# Patient Record
Sex: MALE | Race: BLACK OR AFRICAN AMERICAN | Employment: OTHER | URBAN - METROPOLITAN AREA
[De-identification: names, ages, dates, MRNs, and addresses within clinical notes are randomized per-mention and may not be internally consistent; named-entity substitution may affect disease eponyms.]

---

## 2017-01-18 ENCOUNTER — OFFICE VISIT (OUTPATIENT)
Dept: ORTHOPEDIC SURGERY | Age: 77
End: 2017-01-18

## 2017-01-18 VITALS
OXYGEN SATURATION: 97 % | WEIGHT: 238 LBS | TEMPERATURE: 98.3 F | BODY MASS INDEX: 34.07 KG/M2 | HEIGHT: 70 IN | RESPIRATION RATE: 16 BRPM | HEART RATE: 93 BPM | DIASTOLIC BLOOD PRESSURE: 82 MMHG | SYSTOLIC BLOOD PRESSURE: 182 MMHG

## 2017-01-18 DIAGNOSIS — M47.816 FACET ARTHRITIS OF LUMBAR REGION: Primary | ICD-10-CM

## 2017-01-18 DIAGNOSIS — G62.9 NEUROPATHY: ICD-10-CM

## 2017-01-18 DIAGNOSIS — M48.061 LUMBAR SPINAL STENOSIS: ICD-10-CM

## 2017-01-18 DIAGNOSIS — M62.830 MUSCLE SPASM OF BACK: ICD-10-CM

## 2017-01-18 RX ORDER — CHOLECALCIFEROL (VITAMIN D3) 125 MCG
CAPSULE ORAL
COMMUNITY
End: 2022-01-14

## 2017-01-18 RX ORDER — PANTOPRAZOLE SODIUM 40 MG/1
40 TABLET, DELAYED RELEASE ORAL DAILY
COMMUNITY
End: 2022-01-14

## 2017-01-18 NOTE — PROGRESS NOTES
[facility]  [address] [place], [address]  Phone: [phone]  Fax: [phone]          HISTORY OF PRESENT ILLNESS:  Murray Weems is a 67 y.o. male with history of lumbar pain with bilateral radicular symptoms. He continues to experience sharp, stabbing pain across the lower back and was given information on a RFA at his last office visit. Pt admits to tiring easily and admits to weakness. He has been using Two Old Goats lotion on both legs with benefit. Pt states that he has experienced relief when using marijuana. He continues to take Plavix and Pletal. Pt reports that his blood sugars are well managed at this time. Pt states that he does not wish to try any additional medication at this time. Pt desires to continue with current care. Of note, Pt ambulates with the assistance of a single point cane. Pain Scale: 7/10    PCP: Phoebe Jhaveri MD       Past Medical History   Diagnosis Date    Diabetes ([facility] [address]     Hypercholesteremia     Hypertension     Lumbar pain     Prostate disease     Sleep apnea         Social History     Social History    Marital status:      Spouse name: N/A    Number of children: N/A    Years of education: N/A     Occupational History    Not on file. Social History Main Topics    Smoking status: Former Smoker     Quit date: 10/12/1989    Smokeless tobacco: Not on file    Alcohol use Yes      Comment: occas    Drug use: No    Sexual activity: Not on file     Other Topics Concern    Not on file     Social History Narrative       Current Outpatient Prescriptions   Medication Sig Dispense Refill    pantoprazole (PROTONIX) 40 mg tablet Take 40 mg by mouth daily.  cholecalciferol, vitamin D3, (VITAMIN D3) 2,000 unit tab Take  by mouth.  cilostazol (PLETAL) 100 mg tablet Take 1 Tab by mouth Before breakfast and dinner.  180 Tab 3    vit B Cmplx 3-FA-Vit C-Biotin (NEPHRO ANTOLIN RX) 3- mg-mg-mcg tablet Take 1 Tab by mouth daily.  linagliptin (TRADJENTA) 5 mg tablet Take 5 mg by mouth daily.  CONTOUR NEXT STRIPS strip       LEVEMIR FLEXTOUCH 100 unit/mL (3 mL) pen       hydrochlorothiazide (HYDRODIURIL) 25 mg tablet Take 25 mg by mouth daily.  losartan (COZAAR) 50 mg tablet Take  by mouth daily.  multivitamin, tx-iron-ca-min (THERA-M W/ IRON) 9 mg iron-400 mcg tab tablet Take 1 Tab by mouth daily.  INSULIN LISPRO (HUMALOG KWIKPEN SC) by SubCUTAneous route.  insulin detemir (LEVEMIR) 100 unit/mL injection 0.2 mL by SubCUTAneous route nightly. 1 Vial 1    tadalafil (CIALIS) 20 mg tablet Take 20 mg by mouth as needed.  rosuvastatin (CRESTOR) 10 mg tablet Take 10 mg by mouth nightly.  glimepiride (AMARYL) 4 mg tablet Take 8 mg by mouth every morning.  clopidogrel (PLAVIX) 75 mg tablet Take 75 mg by mouth daily.  tamsulosin (FLOMAX) 0.4 mg capsule Take 0.4 mg by mouth daily.  alprostadil (MUSE) 1,000 mcg supp 1 Suppository by Urethral route as needed. Allergies   Allergen Reactions    Amlodipine Swelling    Gabapentin Hives    Lyrica [Pregabalin] Swelling    Orange Juice Rash    Pollen Extracts Other (comments)    Rash Away  [Zinc Oxide-White Petrolatum] Rash         REVIEW OF SYSTEMS    Constitutional: Negative for fever, chills, or weight change. Respiratory: Negative for cough or shortness of breath. Cardiovascular: Negative for chest pain or palpitations. Gastrointestinal: Negative for acid reflux, change in bowel habits, or constipation. Genitourinary: Negative for dysuria and flank pain. Musculoskeletal: Positive for lumbar pain. Skin: Negative for rash. Neurological: Negative for headaches, dizziness, or numbness. Endo/Heme/Allergies: Negative for increased bruising. Psychiatric/Behavioral: Negative for difficulty with sleep.       PHYSICAL EXAMINATION  Visit Vitals    /82 (BP 1 Location: Right arm, BP Patient Position: Sitting)    Pulse 93    Temp 98.3 °F (36.8 °C) (Oral)    Resp 16    Ht 5' 10\" (1.778 m)    Wt 238 lb (108 kg)    SpO2 97%    BMI 34.15 kg/m2       Constitutional: Awake, alert, and in no acute distress  Neurological: 1+ symmetrical DTRs in the lower extremities. Sensation to light touch is intact. Skin: warm, dry, and intact. Musculoskeletal: Tenderness to palpation in the lower lumbar region. Moderate pain with extension and axial loading. Better with forward flexion. No pain with internal or external rotation of his hips. Negative straight leg raise bilaterally. Hip Flex  Quads Hamstrings Ankle DF EHL Ankle PF   Right +4/5 +4/5 +4/5 +4/5 +4/5 +4/5   Left +4/5 +4/5 +4/5 +4/5 +4/5 +4/5     IMAGING:    Lumbar Spine MRI from 09/13/2016 was personally reviewed with the Pt and demonstrated:    Results from [place] encounter on 09/13/16   MRI LUMB SPINE WO CONT   Narrative EXAM:  MRI LUMB SPINE WO CONT    INDICATION:   Lumbar spinal stenosis, facet arthritis of lumbar region    COMPARISON: MRI lumbar spine 12/20/2014    TECHNIQUE:   MR imaging of the lumbar spine was performed with sagittal T1, T2, STIR;  axial  T1, T2. Contrast was not administered. FINDINGS:  Mild leftward curvature of the lumbar spine apex left at L3/4. Vertebral body  heights are maintained. Multilevel disc desiccation and disc height loss. Interval worsening of discogenic bone marrow edema signal at L2/3 more towards  the right and central aspect of the disc. Less extensive discogenic bone marrow  edema signal posterior L4/L5. There are chronic fat signal discogenic bone  marrow changes at L3/4. The conus medullaris is normal in signal intensity terminating at upper L2  level. Correlation of sagittal and axial images demonstrates the following:    T12/L1:  The spinal canal and neuroforamina are widely patent. L1/2:  No disc pathology. Patent central canal and neural foramina.     L2/3: Moderately severe disc height loss with diffuse disc bulge with  osteophyte complex. Moderate facet and ligamentous hypertrophy. Severe central  canal stenosis, progressed from prior. Severe right neural foramen stenosis with  right foraminal protrusion impinging on the right L2 foraminal nerve root. Moderate left neural foraminal stenosis similar to prior] hypertrophy contacting  the left foraminal nerve root. L3/4:  Moderately severe disc height loss with diffuse disc bulge and osteophyte  complex. Moderate facet and ligamentum hypertrophy with prominent epidural fat. Moderate to severe central stenosis, similar to prior. Moderate right and mild  left foraminal stenosis similar to prior. L4/5:  Trace grade 1 anterolisthesis and mild disc height loss. Moderate facet  and ligamentous hypertrophy. Decrease in size of the previously seen disc  herniation. Moderate central stenosis improved from prior. Moderate bilateral  foraminal stenosis progressed on the right since comparison. L5/S1:  Mild central disc protrusion. Moderately severe facet hypertrophy. Patent central canal. Mild foraminal stenosis. Overall similar to prior. Asymmetric right psoas muscular atrophy at L3 level. No prevertebral soft tissue  edema or phlegmon. No retroperitoneal adenopathy. Impression IMPRESSION:    1. Moderate to advanced multilevel degenerative disc and facet joint disease and  multifactorial high-grade central canal stenosis progressed at   L2/3 and L3/4 but improved moderate stenosis at L4/5.  -Interval progression of discogenic bone marrow edema at L2/L3 more towards the  right and posterior L4/L5 suggesting inflammation and can be source of pain. 2. Multilevel high-grade foraminal stenosis as discussed  - Of note, right L2/L3 foraminal disc protrusion impinging on the right L2  foraminal nerve root, new since prior.         Peripheral Artery Testing from 07/28/2016 was reviewed and demonstrated:  INTERPRETATION/FINDINGS  Physiologic testing was performed using continuous wave doppler and  segmental pressures. 1. By waveform analysis there is mild to moderate peripheral arterial  insufficiency at rest in the right leg with infrapopliteal disease. 2. By waverform analysis there is mild to moderate peripheral arterial  insufficiency at rest in the left leg with infrapopliteal disease. 3. The right ankle/brachial index is 1.46 and the left is 1.12, both  falsely elevated and unreliable due to arterial calcifications. 4. The right digit/brachial index is 0.59 and the left digit/brachial  index is 0.50.  5. With limited treadmill of 2.5 minutes the drop in AI's cannot be  calculated due to arterial calcifications. The common femoral  arteries were biphasic consistent with normal inflow, the right  posterior tibial artery became monophasic and the left posterior  tibial was biphasic. ASSESSMENT   Reid was seen today for back pain and leg pain. Diagnoses and all orders for this visit:    Facet arthritis of lumbar region  -     REFERRAL TO PAIN MANAGEMENT    Muscle spasm of back  -     REFERRAL TO PAIN MANAGEMENT    Lumbar spinal stenosis  -     REFERRAL TO PAIN MANAGEMENT    Neuropathy  -     REFERRAL TO PAIN MANAGEMENT       IMPRESSION AND PLAN:  Reid Guidry is a 67 y.o. male with history of lumbar pain with bilateral radicular symptoms. He continues to experience sharp, stabbing pain across the lower back and was given information on a RFA at his last office visit. Pt admits to tiring easily and admits to weakness. He has been using Two Old Goats lotion on both legs with benefit. More than 25 minutes of face to face contact was spent with the Pt during today's office visit extensively discussing his symptoms and treatment plan. 1) Pt was given information on lumbar arthritis exercises. 2) Discussed treatment options with the Pt, including a RFA.   3) Pt was given information on radiofrequency ablation. 4) He was referred to Dr. Brian Cornell for RFA evaluation. 5) Pt was informed of proper lifting mechanics. 6) Mr. Lina Montanez has a reminder for a \"due or due soon\" health maintenance. I have asked that he contact his primary care provider, Jazmyne Zhong MD,  for follow-up on this health maintenance. 7)  reviewed. 8) Pt will follow-up in 3 months.       Written by Kei Cordero, as dictated by Negrita Queen MD.

## 2017-01-18 NOTE — PATIENT INSTRUCTIONS
Low Back Arthritis: Exercises  Your Care Instructions  Here are some examples of typical rehabilitation exercises for your condition. Start each exercise slowly. Ease off the exercise if you start to have pain. Your doctor or physical therapist will tell you when you can start these exercises and which ones will work best for you. When you are not being active, find a comfortable position for rest. Some people are comfortable on the floor or a medium-firm bed with a small pillow under their head and another under their knees. Some people prefer to lie on their side with a pillow between their knees. Don't stay in one position for too long. Take short walks (10 to 20 minutes) every 2 to 3 hours. Avoid slopes, hills, and stairs until you feel better. Walk only distances you can manage without pain, especially leg pain. How to do the exercises  Pelvic tilt    1. Lie on your back with your knees bent. 2. \"Brace\" your stomachtighten your muscles by pulling in and imagining your belly button moving toward your spine. 3. Press your lower back into the floor. You should feel your hips and pelvis rock back. 4. Hold for 6 seconds while breathing smoothly. 5. Relax and allow your pelvis and hips to rock forward. 6. Repeat 8 to 12 times. Back stretches    1. Get down on your hands and knees on the floor. 2. Relax your head and allow it to droop. Round your back up toward the ceiling until you feel a nice stretch in your upper, middle, and lower back. Hold this stretch for as long as it feels comfortable, or about 15 to 30 seconds. 3. Return to the starting position with a flat back while you are on your hands and knees. 4. Let your back sway by pressing your stomach toward the floor. Lift your buttocks toward the ceiling. 5. Hold this position for 15 to 30 seconds. 6. Repeat 2 to 4 times. Follow-up care is a key part of your treatment and safety.  Be sure to make and go to all appointments, and call your doctor if you are having problems. It's also a good idea to know your test results and keep a list of the medicines you take. Where can you learn more? Go to http://ree-jaimee.info/. Enter K121 in the search box to learn more about \"Low Back Arthritis: Exercises. \"  Current as of: May 23, 2016  Content Version: 11.1  © 6021-2476 [employer]. Care instructions adapted under license by [employer] (which disclaims liability or warranty for this information). If you have questions about a medical condition or this instruction, always ask your healthcare professional. [place] [address] any warranty or liability for your use of this information. Learning About Medial Branch Block and Neurotomy  What are medial branch block and neurotomy? Facet joints connect your vertebrae to each other. Problems in these joints can cause chronic (long-term) pain in the neck or back. They can sometimes affect the shoulders, arms, buttocks, or legs. Medial branch nerves are the nerves that carry many of the pain messages from your facet joints. Radiofrequency medial branch neurotomy is a type of medial branch neurotomy that is used to relieve arthritis pain. It uses radio waves to damage nerves in your neck or back so that they can no longer send pain messages to your brain. Before your doctor knows if a neurotomy will help you, he or she will do a medial branch block to find out if certain nerves are the ones that are a source of your pain. You will need two separate visits to the outpatient center or hospital to have both procedures. How is a medial branch block done? The doctor will use a tiny needle to numb the skin where you will get the block. Then he or she puts the block needle into the numbed area. You may feel some pressure, but you should not feel pain.  Using fluoroscopy (live X-ray) to guide the needle, the doctor injects medicine onto one or more nerves to make them numb. If you get relief from your pain in the next 4 to 6 hours, it's a sign that those nerves may be contributing to your pain. The relief will last only a short time. You may then have a medial branch neurotomy at a later visit to try to get longer relief. It takes 20 to 30 minutes to get the block. You can go home after the doctor watches you for about an hour. You will get instructions on how to report how much pain you have when you are at home. You will need someone to drive you home. How is medial branch neurotomy done? The doctor will use a tiny needle to numb the skin where you will get the neurotomy. Then he or she puts the neurotomy needle into the numbed area. You may feel some pressure. Using fluoroscopy (live X-ray) to guide the needle, the doctor sends radio waves through the needle to the nerve for 60 to 90 seconds. The radio waves heat the nerve, which damages it. The doctor may do this several times. And he or she may treat more than one nerve. It takes 45 to 90 minutes to get a neurotomy, depending on how many nerves are heated. You will probably go home 30 to 60 minutes later. You will need someone to drive you home. What can you expect after a neurotomy? You may feel a little sore or tender at the injection site at first. But after a successful neurotomy, most people have pain relief right away. It often lasts for 9 to 12 months or longer. Sometimes the pain relief is permanent. If your pain does come back, it may mean that the damaged nerve has healed and can send pain messages again. Or it can mean that a different nerve is causing pain. Your doctor will discuss your options with you. Follow-up care is a key part of your treatment and safety. Be sure to make and go to all appointments, and call your doctor if you are having problems. It's also a good idea to know your test results and keep a list of the medicines you take. Where can you learn more?   Go to http://patricio.info/. Enter W698 in the search box to learn more about \"Learning About Medial Branch Block and Neurotomy. \"  Current as of: February 19, 2016  Content Version: 11.1  © 5528-2242 [employer]. Care instructions adapted under license by [employer] (which disclaims liability or warranty for this information). If you have questions about a medical condition or this instruction, always ask your healthcare professional. [address] any warranty or liability for your use of this information.

## 2017-01-18 NOTE — MR AVS SNAPSHOT
Visit Information Date & Time Provider Department Dept. Phone Encounter #  
 1/18/2017  9:00 AM Michelle Arredondo, 27 [facility] [phone] [id number] Follow-up Instructions Routing History Your Appointments 4/19/2017 10:45 AM  
Follow Up with Jarocho Bedolla MD  
[facility] ([facility]) Appt Note: 6 month [address] [facility] [address]  
  
   
 [address] [place] [address] 7/18/2017  2:45 PM  
PROCEDURE with [facility] NONIMAGING  
[facility] ([facility] SCHEDULING) Appt Note: BL leg art/[place] [address] [address]  
[phone] [address] [place] [address]  
  
    
 7/25/2017  9:00 AM  
Office Visit with Anell Kayser, PA  
[facility] ([facility] SCHEDULING) Appt Note: FU after studies [address] [address]  
[phone]  
  
   
 [address] [address] Upcoming Health Maintenance Date Due  
 LIPID PANEL Q1 1940 FOOT EXAM Q1 9/19/1949 MICROALBUMIN Q1 9/19/1949 EYE EXAM RETINAL OR DILATED Q1 9/19/1949 DTaP/Tdap/Td series (1 - Tdap) 9/19/1960 ZOSTER VACCINE AGE 60> 9/19/1999 GLAUCOMA SCREENING Q2Y 9/19/2004 Pneumococcal 65+ High/Highest Risk (1 of 2 - PCV13) 9/19/2004 MEDICARE YEARLY EXAM 9/19/2004 HEMOGLOBIN A1C Q6M 10/16/2015 INFLUENZA AGE 9 TO ADULT 8/1/2016 Allergies as of 1/18/2017  Review Complete On: 1/18/2017 By: Michelle Arredondo MD  
  
 Severity Noted Reaction Type Reactions Amlodipine    Swelling Gabapentin  04/16/2015    Hives Lyrica [Pregabalin]    Swelling Orange Juice    Rash Pollen Extracts  01/27/2016    Other (comments) Rash Away  [Zinc Oxide-white Petrolatum]  01/27/2016    Rash Current Immunizations  Never Reviewed No immunizations on file. Not reviewed this visit You Were Diagnosed With   
  
 Codes Comments Facet arthritis of lumbar region    -  Primary ICD-10-CM: M46.96 
ICD-9-CM: 721.3 Muscle spasm of back     ICD-10-CM: D86.221 ICD-9-CM: 724.8 Lumbar spinal stenosis     ICD-10-CM: M48.06 
ICD-9-CM: 724.02 Neuropathy     ICD-10-CM: G62.9 ICD-9-CM: 562. 9 Vitals BP Pulse Temp Resp Height(growth percentile) Weight(growth percentile) 182/82 (BP 1 Location: Right arm, BP Patient Position: Sitting) 93 98.3 °F (36.8 °C) (Oral) 16 5' 10\" (1.778 m) 238 lb (108 kg) SpO2 BMI Smoking Status 97% 34.15 kg/m2 Former Smoker BMI and BSA Data Body Mass Index Body Surface Area  
 34.15 kg/m 2 2.31 m 2 Preferred Pharmacy Pharmacy Name Phone [address] [facility] [phone] Your Updated Medication List  
  
   
This list is accurate as of: 1/18/17 10:20 AM.  Always use your most recent med list.  
  
  
  
  
 CIALIS 20 mg tablet Generic drug:  tadalafil Take 20 mg by mouth as needed. cilostazol 100 mg tablet Commonly known as:  PLETAL Take 1 Tab by mouth Before breakfast and dinner. CONTOUR NEXT STRIPS strip Generic drug:  glucose blood VI test strips CRESTOR 10 mg tablet Generic drug:  rosuvastatin Take 10 mg by mouth nightly. FLOMAX 0.4 mg capsule Generic drug:  tamsulosin Take 0.4 mg by mouth daily. glimepiride 4 mg tablet Commonly known as:  AMARYL Take 8 mg by mouth every morning. HUMALOG KWIKPEN SC  
by SubCUTAneous route. hydroCHLOROthiazide 25 mg tablet Commonly known as:  HYDRODIURIL Take 25 mg by mouth daily. * insulin detemir 100 unit/mL injection Commonly known as:  LEVEMIR  
0.2 mL by SubCUTAneous route nightly. * LEVEMIR FLEXTOUCH 100 unit/mL (3 mL) Inpn Generic drug:  insulin detemir losartan 50 mg tablet Commonly known as:  COZAAR Take  by mouth daily. multivitamin, tx-iron-ca-min 9 mg iron-400 mcg Tab tablet Commonly known as:  THERA-M w/ IRON Take 1 Tab by mouth daily. MUSE 1,000 mcg Supp Generic drug:  alprostadil 1 Suppository by Urethral route as needed. pantoprazole 40 mg tablet Commonly known as:  PROTONIX Take 40 mg by mouth daily. PLAVIX 75 mg Tab Generic drug:  clopidogrel Take 75 mg by mouth daily. TRADJENTA 5 mg tablet Generic drug:  linagliptin Take 5 mg by mouth daily. vit B Cmplx 3-FA-Vit C-Biotin 1- mg-mg-mcg tablet Commonly known as:  NEPHRO ANTOLIN RX Take 1 Tab by mouth daily. VITAMIN D3 2,000 unit Tab Generic drug:  cholecalciferol (vitamin D3) Take  by mouth. * Notice: This list has 2 medication(s) that are the same as other medications prescribed for you. Read the directions carefully, and ask your doctor or other care provider to review them with you. We Performed the Following REFERRAL TO PAIN MANAGEMENT [[id number] Custom] Comments:  
 Evaluate for RF  L3, L4 ,L5 Referral Information Referral ID Referred By Referred To  
  
 [id number] Lidya Yanez MD   
   [address] [place] [address] [facility] [address] Phone: [phone] Fax: [phone] Visits Status Start Date End Date 1 New Request 1/18/17 1/18/18 If your referral has a status of pending review or denied, additional information will be sent to support the outcome of this decision. Patient Instructions Low Back Arthritis: Exercises Your Care Instructions Here are some examples of typical rehabilitation exercises for your condition. Start each exercise slowly. Ease off the exercise if you start to have pain.  
Your doctor or physical therapist will tell you when you can start these exercises and which ones will work best for you. When you are not being active, find a comfortable position for rest. Some people are comfortable on the floor or a medium-firm bed with a small pillow under their head and another under their knees. Some people prefer to lie on their side with a pillow between their knees. Don't stay in one position for too long. Take short walks (10 to 20 minutes) every 2 to 3 hours. Avoid slopes, hills, and stairs until you feel better. Walk only distances you can manage without pain, especially leg pain. How to do the exercises Pelvic tilt 1. Lie on your back with your knees bent. 2. \"Brace\" your stomachtighten your muscles by pulling in and imagining your belly button moving toward your spine. 3. Press your lower back into the floor. You should feel your hips and pelvis rock back. 4. Hold for 6 seconds while breathing smoothly. 5. Relax and allow your pelvis and hips to rock forward. 6. Repeat 8 to 12 times. Back stretches 1. Get down on your hands and knees on the floor. 2. Relax your head and allow it to droop. Round your back up toward the ceiling until you feel a nice stretch in your upper, middle, and lower back. Hold this stretch for as long as it feels comfortable, or about 15 to 30 seconds. 3. Return to the starting position with a flat back while you are on your hands and knees. 4. Let your back sway by pressing your stomach toward the floor. Lift your buttocks toward the ceiling. 5. Hold this position for 15 to 30 seconds. 6. Repeat 2 to 4 times. Follow-up care is a key part of your treatment and safety. Be sure to make and go to all appointments, and call your doctor if you are having problems. It's also a good idea to know your test results and keep a list of the medicines you take. Where can you learn more? Go to http://ree-jaimee.info/. Enter K369 in the search box to learn more about \"Low Back Arthritis: Exercises. \" 
 Current as of: May 23, 2016 Content Version: 11.1 © 6179-3115 [employer]. Care instructions adapted under license by [employer] (which disclaims liability or warranty for this information). If you have questions about a medical condition or this instruction, always ask your healthcare professional. [address] any warranty or liability for your use of this information. Learning About Medial Branch Block and Neurotomy What are medial branch block and neurotomy? Facet joints connect your vertebrae to each other. Problems in these joints can cause chronic (long-term) pain in the neck or back. They can sometimes affect the shoulders, arms, buttocks, or legs. Medial branch nerves are the nerves that carry many of the pain messages from your facet joints. Radiofrequency medial branch neurotomy is a type of medial branch neurotomy that is used to relieve arthritis pain. It uses radio waves to damage nerves in your neck or back so that they can no longer send pain messages to your brain. Before your doctor knows if a neurotomy will help you, he or she will do a medial branch block to find out if certain nerves are the ones that are a source of your pain. You will need two separate visits to the outpatient center or hospital to have both procedures. How is a medial branch block done? The doctor will use a tiny needle to numb the skin where you will get the block. Then he or she puts the block needle into the numbed area. You may feel some pressure, but you should not feel pain. Using fluoroscopy (live X-ray) to guide the needle, the doctor injects medicine onto one or more nerves to make them numb. If you get relief from your pain in the next 4 to 6 hours, it's a sign that those nerves may be contributing to your pain. The relief will last only a short time. You may then have a medial branch neurotomy at a later visit to try to get longer relief. It takes 20 to 30 minutes to get the block. You can go home after the doctor watches you for about an hour. You will get instructions on how to report how much pain you have when you are at home. You will need someone to drive you home. How is medial branch neurotomy done? The doctor will use a tiny needle to numb the skin where you will get the neurotomy. Then he or she puts the neurotomy needle into the numbed area. You may feel some pressure. Using fluoroscopy (live X-ray) to guide the needle, the doctor sends radio waves through the needle to the nerve for 60 to 90 seconds. The radio waves heat the nerve, which damages it. The doctor may do this several times. And he or she may treat more than one nerve. It takes 45 to 90 minutes to get a neurotomy, depending on how many nerves are heated. You will probably go home 30 to 60 minutes later. You will need someone to drive you home. What can you expect after a neurotomy? You may feel a little sore or tender at the injection site at first. But after a successful neurotomy, most people have pain relief right away. It often lasts for 9 to 12 months or longer. Sometimes the pain relief is permanent. If your pain does come back, it may mean that the damaged nerve has healed and can send pain messages again. Or it can mean that a different nerve is causing pain. Your doctor will discuss your options with you. Follow-up care is a key part of your treatment and safety. Be sure to make and go to all appointments, and call your doctor if you are having problems. It's also a good idea to know your test results and keep a list of the medicines you take. Where can you learn more? Go to http://ree-jaimee.info/. Enter W918 in the search box to learn more about \"Learning About Medial Branch Block and Neurotomy. \" Current as of: February 19, 2016 Content Version: 11.1 © 0114-0186 [employer].  Care instructions adapted under license by [employer] (which disclaims liability or warranty for this information). If you have questions about a medical condition or this instruction, always ask your healthcare professional. [address] any warranty or liability for your use of this information. Introducing [facility]! Dear Reid: 
Thank you for requesting a [employer] account. Our records indicate that you already have an active [employer] account. You can access your account anytime at [email or web address] Did you know that you can access your hospital and ER discharge instructions at any time in [employer]? You can also review all of your test results from your hospital stay or ER visit. Additional Information If you have questions, please visit the Frequently Asked Questions section of the [employer] website at [email or web address] Remember, [employer] is NOT to be used for urgent needs. For medical emergencies, dial 911. Now available from your iPhone and Android! Please provide this summary of care documentation to your next provider. Your primary care clinician is listed as [facility]. If you have any questions after today's visit, please call [phone].

## 2017-02-22 ENCOUNTER — OFFICE VISIT (OUTPATIENT)
Dept: PAIN MANAGEMENT | Age: 77
End: 2017-02-22

## 2017-02-22 VITALS
HEART RATE: 101 BPM | SYSTOLIC BLOOD PRESSURE: 163 MMHG | DIASTOLIC BLOOD PRESSURE: 89 MMHG | BODY MASS INDEX: 34.15 KG/M2 | WEIGHT: 238 LBS

## 2017-02-22 DIAGNOSIS — M47.816 LUMBAR FACET ARTHROPATHY: ICD-10-CM

## 2017-02-22 DIAGNOSIS — M51.36 LUMBAR DEGENERATIVE DISC DISEASE: ICD-10-CM

## 2017-02-22 DIAGNOSIS — G89.4 CHRONIC PAIN SYNDROME: Primary | ICD-10-CM

## 2017-02-22 DIAGNOSIS — M47.816 SPONDYLOSIS OF LUMBAR REGION WITHOUT MYELOPATHY OR RADICULOPATHY: ICD-10-CM

## 2017-02-22 DIAGNOSIS — M48.061 LUMBAR SPINAL STENOSIS: ICD-10-CM

## 2017-02-26 PROBLEM — M47.816 LUMBAR FACET ARTHROPATHY: Status: ACTIVE | Noted: 2017-02-26

## 2017-02-26 PROBLEM — M51.36 LUMBAR DEGENERATIVE DISC DISEASE: Status: ACTIVE | Noted: 2017-02-26

## 2017-02-26 PROBLEM — M47.816 SPONDYLOSIS OF LUMBAR REGION WITHOUT MYELOPATHY OR RADICULOPATHY: Status: ACTIVE | Noted: 2017-02-26

## 2017-02-26 PROBLEM — G89.4 CHRONIC PAIN SYNDROME: Status: ACTIVE | Noted: 2017-02-26

## 2017-02-26 NOTE — PROGRESS NOTES
[facility]  Interventional Pain Management Consultation History & Physical    PATIENT NAME:  Reid Henderson     YOB: 1940    DATE OF SERVICE:   2/22/2017      CHIEF COMPLAINT:  Back Pain; Abdominal Pain; and Side Pain      HISTORY OF PRESENT ILLNESS:   Reid Valentine presents to the pain clinic today for initial evaluation and to consider interventional pain management options as indicated for the type and location of the pain the patient is presenting with. Reid Henderson patient presents for initial evaluation and consideration for interventional procedures as indicated. He is referred to us by Dr. Randy Castillo for evaluation and consideration for lumbar radiofrequency neurotomy procedures at bilateral L3-4, L4-5, L5-S1 levels as indicated. At today's evaluation patient endorses    chronic worsening and now severe constant low back pain of long-standing duration. He states his pain is increased as he is become older. It is now quite severe for him. He endorses pain, aching throbbing across his low back referred to both hips. He has trouble moving in any direction including turning side to side as well as extending and flexing his back due to the pain. He also has trouble with axial loading such as prolonged sitting standing and walking. He is stooped over. He uses a cane for walking assistance. He states he occasionally gets sharp shooting pain referred down either leg. He is tried a number of medications with only modest improvement. He cannot participate in physical therapy due to advanced age. He has a number of significant comorbidities. With regard to his comorbidities, and by review of available records, patient has a history of diastolic heart dysfunction grade 1. History chronic shortness of breath. History COPD.   History acute and chronic kidney disease, acute kidney failure in the past, metabolic acidosis in the past.  Peripheral arterial disease. He is chronically on Plavix. He is recently within the last couple of months been started on Pletal for peripheral arterial disease. He is also diabetic. We reviewed his lumbar MRI using actual MRI images    and skeleton model for added emphasis. This demonstrates a number of degenerative lumbar changes including severe lumbar degenerative disc disease, listhesis disease, severe canal as well as neuroforaminal stenoses, severe facet arthropathy and hypertrophy. Lumbar disc osteophyte complexes. Dramatic Modic changes at several levels. We discussed options. This gentleman is presenting with progressive and now severe and constant low back pain. His pain refers to both hips, his pain occasionally radiates down the legs. He is tried a number of different pain medications with modest benefit. He cannot participate in physical therapy due to advanced age. He has a number of very significant and complicating comorbidities including diabetes, peripheral arterial disease, diastolic dysfunction, COPD, as well as advanced age. He has history of acute and chronic kidney disease. His lumbar MRI demonstrates advanced degenerative changes with multiple and severe pain generators noted. He is on 2 antiplatelet regimens. As I have discussed with the patient, I cannot be assured that lumbar radiofrequency neurotomy procedures will take his pain of his low back down sufficiently to warrant the risk of taking him off of 2 antiplatelet regimens. I believe he is too much of a risk of exacerbating his peripheral arterial occlusive disease and possibly exacerbating complications of cardiovascular dysfunction including diastolic dysfunction. I have rather recommended that he continue to optimize medication management rather than pursuing interventional pain procedures as I feel these are prohibitive risk for him. Patient understands and agrees.   He has no further questions. He can follow-up with his other providers. MRI: Discussed using skeleton spine model and actual MRI images    PROCEDURES: Discussed lumbar radiofrequency neurotomy procedures. MRI Results (most recent):    Results from [place] encounter on 09/13/16   MRI LUMB SPINE WO CONT   Narrative EXAM:  MRI LUMB SPINE WO CONT    INDICATION:   Lumbar spinal stenosis, facet arthritis of lumbar region    COMPARISON: MRI lumbar spine 12/20/2014    TECHNIQUE:   MR imaging of the lumbar spine was performed with sagittal T1, T2, STIR;  axial  T1, T2. Contrast was not administered. FINDINGS:  Mild leftward curvature of the lumbar spine apex left at L3/4. Vertebral body  heights are maintained. Multilevel disc desiccation and disc height loss. Interval worsening of discogenic bone marrow edema signal at L2/3 more towards  the right and central aspect of the disc. Less extensive discogenic bone marrow  edema signal posterior L4/L5. There are chronic fat signal discogenic bone  marrow changes at L3/4. The conus medullaris is normal in signal intensity terminating at upper L2  level. Correlation of sagittal and axial images demonstrates the following:    T12/L1:  The spinal canal and neuroforamina are widely patent. L1/2:  No disc pathology. Patent central canal and neural foramina. L2/3:  Moderately severe disc height loss with diffuse disc bulge with  osteophyte complex. Moderate facet and ligamentous hypertrophy. Severe central  canal stenosis, progressed from prior. Severe right neural foramen stenosis with  right foraminal protrusion impinging on the right L2 foraminal nerve root. Moderate left neural foraminal stenosis similar to prior] hypertrophy contacting  the left foraminal nerve root. L3/4:  Moderately severe disc height loss with diffuse disc bulge and osteophyte  complex. Moderate facet and ligamentum hypertrophy with prominent epidural fat.   Moderate to severe central stenosis, similar to prior. Moderate right and mild  left foraminal stenosis similar to prior. L4/5:  Trace grade 1 anterolisthesis and mild disc height loss. Moderate facet  and ligamentous hypertrophy. Decrease in size of the previously seen disc  herniation. Moderate central stenosis improved from prior. Moderate bilateral  foraminal stenosis progressed on the right since comparison. L5/S1:  Mild central disc protrusion. Moderately severe facet hypertrophy. Patent central canal. Mild foraminal stenosis. Overall similar to prior. Asymmetric right psoas muscular atrophy at L3 level. No prevertebral soft tissue  edema or phlegmon. No retroperitoneal adenopathy. Impression IMPRESSION:    1. Moderate to advanced multilevel degenerative disc and facet joint disease and  multifactorial high-grade central canal stenosis progressed at   L2/3 and L3/4 but improved moderate stenosis at L4/5.  -Interval progression of discogenic bone marrow edema at L2/L3 more towards the  right and posterior L4/L5 suggesting inflammation and can be source of pain. 2. Multilevel high-grade foraminal stenosis as discussed  - Of note, right L2/L3 foraminal disc protrusion impinging on the right L2  foraminal nerve root, new since prior. PAST MEDICAL HISTORY:   The patient  has a past medical history of Arthritis; Diabetes ([address] Hypercholesteremia; Hypertension; Lumbar pain; Prostate disease; and Sleep apnea. PAST SURGICAL HISTORY:   The patient  has a past surgical history that includes heent.     CURRENT MEDICATIONS:   The patient has a current medication list which includes the following prescription(s): pantoprazole, cholecalciferol (vitamin d3), cilostazol, vit b cmplx 3-fa-vit c-biotin, linagliptin, levemir flextouch, hydrochlorothiazide, losartan, multivitamin, tx-iron-ca-min, insulin lispro, insulin detemir, tadalafil, rosuvastatin, glimepiride, clopidogrel, tamsulosin, contour next strips, and alprostadil. ALLERGIES:     Allergies   Allergen Reactions    Amlodipine Swelling    Gabapentin Hives    Lyrica [Pregabalin] Swelling    Orange Juice Rash    Pollen Extracts Other (comments)    Rash Away  [Zinc Oxide-White Petrolatum] Rash       FAMILY HISTORY:   The patient family history includes Cancer in his mother and sister; Emphysema in his brother. SOCIAL HISTORY:   The patient  reports that he quit smoking about 27 years ago. He does not have any smokeless tobacco history on file. The patient  reports that he drinks alcohol. He also  reports that he does not use illicit drugs. REVIEW OF SYSTEMS:   The patient denies fever, chills, weight loss (Constitutional), rash, itching (Skin), tinnitus, congestion (HENT), blurred vision, photophobia (Eyes), palpitations, orthopnea (Cardiovascular), hemoptysis, wheezing (Respiratory), nausea, vomiting, diarrhea (Gastrointestinal), dysuria, hematuria, urgency (Genitourinary), endorses easy bruising, bleeding abnormalities (Hematologic), denies bowel or bladder incontinence, loss of consciousness (Neurologic), suicidal or homicidal ideation or hallucinations (Psychiatric). PHYSICAL EXAM:  VS:   Visit Vitals    /89    Pulse (!) 101    Wt 108 kg (238 lb)    BMI 34.15 kg/m2     General: Elderly-appearing , stooped over . Body habitus consistent with recorded height and weight and the calculated BMI. Apparent distress due to low back pain. Head: Normocephalic, atraumatic. Skin: Inspection of the skin reveals no rashes, lesions or infection. CV: Regular rate. No murmurs or rubs noted. No peripheral edema noted. Pulm: Respirations are even and unlabored. Extr: No clubbing, cyanosis, or edema noted. Musculoskeletal:  1. Cervical spine -decreased ROM. No paraspinous tenderness at any level. There is no scoliosis, asymmetry, or musculoskeletal defect. 2. Thoracic spine -decreased ROM. No paraspinous tenderness at any level.   There is no scoliosis, asymmetry, or musculoskeletal defect. 3. Lumbar spine -markedly decreased range of motion all axes . Paraspinous tenderness throughout the lumbar spine . SI joints are nontender bilaterally. There is no scoliosis, asymmetry, or musculoskeletal defect. 4. Right upper extremity - Full ROM. 5/5 muscle strength in all muscle groups. No pain or tenderness in shoulder, elbow, wrist, or hand. 5. Left upper extremity - Full ROM. 5/5 muscle strength in all muscle groups. No pain or tenderness in shoulder, elbow, wrist, or hand. 6. Right lower extremity - Full ROM. 5/5 muscle strength in all muscle groups. No pain, tenderness, or swelling in the hip, knee, ankle or foot. 7. Left lower extremity - Full ROM. 5/5 muscle strength in all muscle groups. No pain, tenderness, or swelling in the hip, knee, ankle or foot. Neurological:  1. Mental Status - Alert, awake and oriented. Speech is clear and appropriate. 2. Cranial Nerves - Extraocular muscles intact bilaterally. Cranial nerves II-XII grossly intact bilaterally. 3. Gait - antalgic   4. Reflexes - 2+ and symmetric throughout. 5. Sensation - Intact to light touch and pin prick. 6. Provocative Tests - Spurlings negative bilaterally. Straight leg raise negative bilaterally. Psychological:  1. Mood and affect - Appropriate. 2. Speech - Appropriate. 3. Though content - Appropriate. 4. Judgment - Appropriate. ASSESSMENT:      ICD-10-CM ICD-9-CM    1. Chronic pain syndrome G89.4 338.4    2. Spondylosis of lumbar region without myelopathy or radiculopathy M47.816 721.3    3. Lumbar spinal stenosis M48.06 724.02    4. Lumbar facet arthropathy (HCC) M12.88 721.3    5. Lumbar degenerative disc disease M51.36 722. 52            PLAN:    1. Diagnoses/Plan: Chronic pain syndrome, lumbar spondylosis, lumbar spinal stenosis, lumbar facet arthropathy, lumbar degenerative disc disease.    2.    I have thoroughly discussed the risks and benefits, side effects and complications, of any and all procedures that were mentioned at today's patient visit. I have usWe discussed options. This gentleman is presenting with progressive and now severe and constant low back pain. His pain refers to both hips, his pain occasionally radiates down the legs. He is tried a number of different pain medications with modest benefit. He cannot participate in physical therapy due to advanced age. He has a number of very significant and complicating comorbidities including diabetes, peripheral arterial disease, diastolic dysfunction, COPD, as well as advanced age. He has history of acute and chronic kidney disease. His lumbar MRI demonstrates advanced degenerative changes with multiple and severe pain generators noted. He is on 2 antiplatelet regimens. As I have discussed with the patient, I cannot be assured that lumbar radiofrequency neurotomy procedures will take his pain of his low back down sufficiently to warrant the risk of taking him off of 2 antiplatelet regimens. I believe he is too much of a risk of exacerbating his peripheral arterial occlusive disease and possibly exacerbating complications of cardiovascular dysfunction including diastolic dysfunction. I have rather recommended that he continue to optimize medication management rather than pursuing interventional pain procedures as I feel these are prohibitive risk for him. Patient understands and agrees. He has no further questions. He can follow-up with his other providers. ed a skeleton model for added emphasis and patient education. I have answered all questions, and I have obtained verbal confirmation for all procedures planned with the patient. 3.    I have reviewed in great detail today the patient's MRI and other imaging studies with the patient. I have explained to the patient their condition using both actual recent and relevant images.  I have used a skeleton model for added emphasis as well as patient education. 4.    I have advised patient to have a primary care provider to continue care for health maintenance and general medical conditions and support for referral to specialty care as needed. 5.    I have reviewed with patient the treatment plan, goals of treatment plan, and limitations of treatment plan, to include the potential for side effects from medications and procedures. If side effects occur, it is the responsibility of the patient to inform the clinic so that a change in the treatment plan can be made in a safe manner. The patient is advised that stopping prescribed medication may cause an increase in symptoms and possible medication withdrawal symptoms. The patient is informed an emergency room evaluation may be necessary if this occurs. DISPOSITION:   The patients condition and plan were discussed at length and all questions were answered. The patient agrees with the plan. A total of 40 minutes was spent with the patient of which over half of the time was spent counseling the patient. Lilia Montoya MD 2/26/2017 8:38 AM    Note: Although these clinic notes were documented by the provider at the time of the exam, they have not been proofed and are subject to transcription variance.

## 2017-07-18 ENCOUNTER — OFFICE VISIT (OUTPATIENT)
Dept: VASCULAR SURGERY | Age: 77
End: 2017-07-18

## 2017-07-18 ENCOUNTER — APPOINTMENT (OUTPATIENT)
Dept: VASCULAR SURGERY | Age: 77
End: 2017-07-18

## 2017-07-18 DIAGNOSIS — I73.9 PVD (PERIPHERAL VASCULAR DISEASE) (HCC): Primary | ICD-10-CM

## 2017-07-18 NOTE — PROCEDURES
[facility]   *** FINAL REPORT ***    Name: Marlene Joseph  MRN: [id number]       Outpatient  : 19 Sep 1940  HIS Order #: [id number]  [address] [facility] Visit #: [id number]  Date: 2017    TYPE OF TEST: Peripheral Arterial Testing    REASON FOR TEST  Peripheral vascular dz NOS    Right Leg  Segmentals: Normal                     mmHg  Brachial         141  High thigh  Low thigh  Calf  Posterior tibial 173  Dorsalis pedis   109  Peroneal  Metatarsal        87  Toe pressure  Doppler:    Normal  Ankle/Brachial: 1.23    Left Leg  Segmentals: Abnormal                     mmHg  Brachial         128  High thigh  Low thigh  Calf             247  Posterior tibial  85  Dorsalis pedis    77  Peroneal  Metatarsal  Toe pressure  Doppler:    Abnormal  Ankle/Brachial: 0.60    INTERPRETATION/FINDINGS  Physiologic testing was performed using continuous wave doppler and  segmental pressures. 1. No evidence of significant peripheral arterial disease at rest in  the right leg by AI, however by waveform there is mild to moderate  disease suggested. 2. Moderate peripheral arterial disease indicated at rest in the left  leg by AI, by waveform moderate to severe disease suggested. 3. The ankle brachial index is 1.23 on the right and 1.75 on the left  and falsely elevated secondary to calcified vessels. 4. The digital/brachial index is 0.62 on the right and 0.55 on the  left. ADDITIONAL COMMENTS    I have personally reviewed the data relevant to the interpretation of  this  study. TECHNOLOGIST: Rupert Pineda RDMS  Signed: 2017 04:59 PM    PHYSICIAN: Rosalia Mathur.  Tom Rodriguez MD  Signed: 2017 11:04 AM

## 2017-07-19 NOTE — PROCEDURES
[facility]   *** FINAL REPORT ***    Name: Deirdre Whitlock  MRN: [id number]       Outpatient  : 19 Sep 1940  HIS Order #: [id number]  [address] [facility] Visit #: [id number]  Date: 2017    TYPE OF TEST: Extremity Arterial Duplex    REASON FOR TEST  Limb swelling                            Right                     Left  Artery               PSV   Finding             PSV   Finding  ------------------  -----  ---------------    -----  ---------------  External iliac:  Common femoral:     134.0                     111.0  Profunda femoris:   120.0                     123.0  Proximal SFA:        76.0                      98.0  Mid SFA:            211.0  >50% stenosis      344.0  >50% stenosis  Distal SFA:         468.0                     157.0  Popliteal:          103.0                      62.0  Anterior tibial:    134.0  >50% stenosis       19.0  Occluded  Posterior tibial:   206.0  >50% stenosis      179.0  >75% stenosis    Pressures               Right     Left               -----     -----     Brachial:   141       128           DP:   109        85           PT:   173       247            AI:  1.23      1.75            Toe:    87        77      INTERPRETATION/FINDINGS  Duplex images were obtained using 2-D gray scale, color flow and  spectral doppler analysis. Right leg :  1. Diffuse plaquing with 50-75% stenosis of the distal superficial  femoral, anterior tibial and posterior tibial arteries. 2. Mulitphasic flow noted throughout. 3. The ankle brachial index is 1.23. Left leg :  1. Diffuse atherosclerotic plaquing noted throughout the lower  extremity. 2. 50-75% stenosis of the mid superficial femoral artery suggested  with biphasic doppler signals noted. 3. Arterial calcification noted with poor visualization of the tibial  vessels. >75% stenosis of the posterior tibial artery. 4. Occlusion of the mid to distal anterior tibial artery.   5. A monophasic signal is demonstrated in the anterior tibial,  posterior tibial and peroneal arteries. 6. The ankle brachial index is 1.75 and erroneous. ADDITIONAL COMMENTS    I have personally reviewed the data relevant to the interpretation of  this  study. TECHNOLOGIST: Nela Joseph RDMS  Signed: 07/19/2017 01:50 PM    PHYSICIAN: Shanon Corona MD  Signed: 07/19/2017 04:16 PM

## 2017-07-25 ENCOUNTER — OFFICE VISIT (OUTPATIENT)
Dept: VASCULAR SURGERY | Age: 77
End: 2017-07-25

## 2017-07-25 VITALS
HEIGHT: 70 IN | HEART RATE: 80 BPM | RESPIRATION RATE: 20 BRPM | DIASTOLIC BLOOD PRESSURE: 78 MMHG | WEIGHT: 238 LBS | BODY MASS INDEX: 34.07 KG/M2 | SYSTOLIC BLOOD PRESSURE: 162 MMHG

## 2017-07-25 DIAGNOSIS — I65.23 CAROTID STENOSIS, ASYMPTOMATIC, BILATERAL: ICD-10-CM

## 2017-07-25 DIAGNOSIS — I73.9 PAD (PERIPHERAL ARTERY DISEASE) (HCC): Primary | ICD-10-CM

## 2017-07-25 DIAGNOSIS — G62.9 NEUROPATHY: ICD-10-CM

## 2017-07-25 DIAGNOSIS — E13.9 DIABETES 1.5, MANAGED AS TYPE 2 (HCC): ICD-10-CM

## 2017-07-25 RX ORDER — CILOSTAZOL 100 MG/1
100 TABLET ORAL
Qty: 180 TAB | Refills: 6 | Status: SHIPPED | OUTPATIENT
Start: 2017-07-25 | End: 2021-03-08

## 2017-07-25 NOTE — PROGRESS NOTES
Reid Henderson    Chief Complaint   Patient presents with    Leg Pain       History and Physical    Mr Chris Reynoso came initially last fall at the request of both his PCP and spine specialist to evaluate PAD  He does have known radiculopathy and diabetic neuropathy knowingly contributing to bilateral leg pain  But he had reported pain in his lower legs and feet particularly with walking and was relieved with rest. It had been progressive over the years, and by the time he came here, was limited to less than one block of ambulation    He denied then and continues to deny any symptoms of rest pain, and he has had no history of diabetic foot problems/ulcers    We had discussed medical therapy - and actually added pletal  We did discuss angiogram, but he wasn't thrilled with idea of a procedure     He followed up in December, and had reported improved exercise tolerance, up to 4 blocks.  The only reason he stopped at that distance was related to his back  We agreed to continue current treatment regimen with the pletal/plavix; continue exercise routine, and at least establish surveillance, for which he is here today    He continues to provide the same history as noted in December    Past Medical History:   Diagnosis Date    Arthritis     Diabetes ([address]     Hypercholesteremia     Hypertension     Lumbar pain     Prostate disease     Sleep apnea      Patient Active Problem List   Diagnosis Code    Dehydration E86.0    Acute renal failure (ARF) ([address] N17.9    Diarrhea R19.7    Hyperkalemia, diminished renal excretion J53.9    Metabolic acidosis H75.2    Diabetes 1.5, 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    Acute renal failure ([address] N17.9    Lumbar pain M54.5    Lumbar spinal stenosis M48.06    Facet arthritis of lumbar region ([address] M46.96    Neuropathy ([address] G62.9    Peripheral arterial disease (HCC) I73.9    Muscle spasm of back M62.830    Benign essential HTN I10    Dyslipidemia, goal LDL below 100 E78.5  SOB (shortness of breath) on exertion R06.02    CKD (chronic kidney disease) N18.9    Type 2 diabetes mellitus with other circulatory complications ([facility]) R10.31    PAD (peripheral artery disease) ([facility]) I73.9    Chronic pain syndrome G89.4    Spondylosis of lumbar region without myelopathy or radiculopathy M47.816    Lumbar facet arthropathy M12.88    Lumbar degenerative disc disease M51.36     Past Surgical History:   Procedure Laterality Date    HX HEENT       Current Outpatient Prescriptions   Medication Sig Dispense Refill    cilostazol (PLETAL) 100 mg tablet Take 1 Tab by mouth Before breakfast and dinner. 180 Tab 6    pantoprazole (PROTONIX) 40 mg tablet Take 40 mg by mouth daily.  cholecalciferol, vitamin D3, (VITAMIN D3) 2,000 unit tab Take  by mouth.  cilostazol (PLETAL) 100 mg tablet Take 1 Tab by mouth Before breakfast and dinner. 180 Tab 3    vit B Cmplx 3-FA-Vit C-Biotin (NEPHRO ANTOLIN RX) 1- mg-mg-mcg tablet Take 1 Tab by mouth daily.  linagliptin (TRADJENTA) 5 mg tablet Take 5 mg by mouth daily.  CONTOUR NEXT STRIPS strip       LEVEMIR FLEXTOUCH 100 unit/mL (3 mL) pen       hydrochlorothiazide (HYDRODIURIL) 25 mg tablet Take 25 mg by mouth daily.  losartan (COZAAR) 50 mg tablet Take  by mouth daily.  multivitamin, tx-iron-ca-min (THERA-M W/ IRON) 9 mg iron-400 mcg tab tablet Take 1 Tab by mouth daily.  INSULIN LISPRO (HUMALOG KWIKPEN SC) by SubCUTAneous route.  insulin detemir (LEVEMIR) 100 unit/mL injection 0.2 mL by SubCUTAneous route nightly. 1 Vial 1    tadalafil (CIALIS) 20 mg tablet Take 20 mg by mouth as needed.  rosuvastatin (CRESTOR) 10 mg tablet Take 10 mg by mouth nightly.  glimepiride (AMARYL) 4 mg tablet Take 8 mg by mouth every morning.  alprostadil (MUSE) 1,000 mcg supp 1 Suppository by Urethral route as needed.  clopidogrel (PLAVIX) 75 mg tablet Take 75 mg by mouth daily.       tamsulosin (FLOMAX) 0.4 mg capsule Take 0.4 mg by mouth daily. Allergies   Allergen Reactions    Amlodipine Swelling    Gabapentin Hives    Lyrica [Pregabalin] Swelling    Orange Juice Rash    Pollen Extracts Other (comments)    Rash Away  [Zinc Oxide-White Petrolatum] Rash       Review of Systems    Review of Systems - History obtained from the patient  General ROS: negative  Ophthalmic ROS: negative  Psychological ROS: negative  Endocrine ROS: diabetic neuropathy  Respiratory ROS: negative  Cardiovascular ROS: negative  Gastrointestinal ROS: negative  Musculoskeletal ROS: positive for - pain in back - lower  Neurological ROS: no TIA or stroke symptoms  Dermatological ROS: negative  Vascular ROS: claudication    Physical   Visit Vitals    /78 (BP 1 Location: Left arm, BP Patient Position: Sitting)    Pulse 80    Resp 20    Ht 5' 10\" (1.778 m)    Wt 238 lb (108 kg)    BMI 34.15 kg/m2     General:  Alert, cooperative, no distress. Head:  Normocephalic, without obvious abnormality, atraumatic. Eyes:    Conjunctivae/corneas clear. Pupils equal, round, reactive to light. Extraocular movements intact. Neck:         No bruits   Lungs:   Clear to auscultation bilaterally. Heart:  Regular rate and rhythm, S1, S2 normal   Extremities: Extremities normal, atraumatic, no cyanosis or edema. Pulses: Distal pulses nonpalpable but no ischemic changes   Skin: Skin color, texture, turgor normal. No rashes or lesions. Vascular studies:  Right leg :  1. Diffuse plaquing with 50-75% stenosis of the distal superficial  femoral, anterior tibial and posterior tibial arteries. 2. Mulitphasic flow noted throughout. 3. The ankle brachial index is 1.23. Left leg :  1. Diffuse atherosclerotic plaquing noted throughout the lower  extremity. 2. 50-75% stenosis of the mid superficial femoral artery suggested  with biphasic doppler signals noted. 3. Arterial calcification noted with poor visualization of the tibial  vessels.  >75% stenosis of the posterior tibial artery. 4. Occlusion of the mid to distal anterior tibial artery. 5. A monophasic signal is demonstrated in the anterior tibial,  posterior tibial and peroneal arteries. 6. The ankle brachial index is 1.75 and erroneous.       Impression/Plan:     ICD-10-CM ICD-9-CM    1. PAD (peripheral artery disease) ([facility]) I73.9 443.9 VAS DUPLEX LOW EXT ARTERY BILAT AMB   2. Neuropathy ([facility]) G62.9 355.9    3. 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 Carotid stenosis, asymptomatic, bilateral I65.23 433.10 DUPLEX CAROTID BILATERAL AMB     433.30      Orders Placed This Encounter    VAS DUPLEX LOW EXT ARTERY BILAT AMB    DUPLEX CAROTID BILATERAL AMB    cilostazol (PLETAL) 100 mg tablet     We reviewed his results and different degrees of symptoms of peripheral arterial disease. He feels that the Pletal is working well for him, and he is not experiencing any rest pain, has no tissue loss, and mobility is good. He feels mostly limited by his back. But he does acknowledge an understanding that if he has symptoms or concerns that he feels would reflect claudication and he is ready to consider angiogram, to call and we will get him back in sooner to further discuss treatment plan. Otherwise we will establish continued yearly surveillance, and will also include a follow-up carotid duplex at that time. Total time today mostly which was discussion was 20-25 minutes    Follow-up Disposition:  Return in about 1 year (around 7/25/2018). OLVIN Gomez    Portions of this note have been entered using voice recognition software.

## 2017-07-25 NOTE — MR AVS SNAPSHOT
Visit Information Date & Time Provider Department Dept. Phone Encounter #  
 7/25/2017  9:00 AM Maryuri Ivan, Nichole N [facility] [phone] [id number] Follow-up Instructions Return in about 1 year (around 7/25/2018). Your Appointments 7/24/2018  9:15 AM  
Follow Up with [facility] ([facility]) Appt Note: one year f/u  
 [address], [address] 200 [facility] [address]  
[phone] 1212 [facility] [place] 200 [facility] Upcoming Health Maintenance Date Due  
 LIPID PANEL Q1 1940 FOOT EXAM Q1 9/19/1949 MICROALBUMIN Q1 9/19/1949 EYE EXAM RETINAL OR DILATED Q1 9/19/1949 DTaP/Tdap/Td series (1 - Tdap) 9/19/1960 ZOSTER VACCINE AGE 60> 7/19/1999 GLAUCOMA SCREENING Q2Y 9/19/2004 Pneumococcal 65+ High/Highest Risk (1 of 2 - PCV13) 9/19/2004 MEDICARE YEARLY EXAM 9/19/2004 HEMOGLOBIN A1C Q6M 10/16/2015 INFLUENZA AGE 9 TO ADULT 8/1/2017 Allergies as of 7/25/2017  Review Complete On: 7/25/2017 By: Graham Victor LPN Severity Noted Reaction Type Reactions Amlodipine    Swelling Gabapentin  04/16/2015    Hives Lyrica [Pregabalin]    Swelling Orange Juice    Rash Pollen Extracts  01/27/2016    Other (comments) Rash Away  [Zinc Oxide-white Petrolatum]  01/27/2016    Rash Current Immunizations  Never Reviewed No immunizations on file. Not reviewed this visit You Were Diagnosed With   
  
 Codes Comments PAD (peripheral artery disease) (HCC)    -  Primary ICD-10-CM: I73.9 ICD-9-CM: 443.9 Neuropathy ([facility]     ICD-10-CM: G62.9 ICD-9-CM: 355.9 Diabetes 1.5, managed as type 2 ([facility]     ICD-10-CM: E13.9 ICD-9-CM: 250.00 Carotid stenosis, asymptomatic, bilateral     ICD-10-CM: I65.23 ICD-9-CM: 433.10, 433.30 Vitals BP Pulse Resp Height(growth percentile) Weight(growth percentile) BMI  
 162/78 (BP 1 Location: Left arm, BP Patient Position: Sitting) 80 20 5' 10\" (1.778 m) 238 lb (108 kg) 34.15 kg/m2 Smoking Status Former Smoker Vitals History BMI and BSA Data Body Mass Index Body Surface Area  
 34.15 kg/m 2 2.31 m 2 Preferred Pharmacy Pharmacy Name Phone [address] [facility] [phone] Your Updated Medication List  
  
   
This list is accurate as of: 7/25/17  9:35 AM.  Always use your most recent med list.  
  
  
  
  
 CIALIS 20 mg tablet Generic drug:  tadalafil Take 20 mg by mouth as needed. * cilostazol 100 mg tablet Commonly known as:  PLETAL Take 1 Tab by mouth Before breakfast and dinner. * cilostazol 100 mg tablet Commonly known as:  PLETAL Take 1 Tab by mouth Before breakfast and dinner. CONTOUR NEXT STRIPS strip Generic drug:  glucose blood VI test strips CRESTOR 10 mg tablet Generic drug:  rosuvastatin Take 10 mg by mouth nightly. FLOMAX 0.4 mg capsule Generic drug:  tamsulosin Take 0.4 mg by mouth daily. glimepiride 4 mg tablet Commonly known as:  AMARYL Take 8 mg by mouth every morning. HUMALOG KWIKPEN SC  
by SubCUTAneous route. hydroCHLOROthiazide 25 mg tablet Commonly known as:  HYDRODIURIL Take 25 mg by mouth daily. * insulin detemir 100 unit/mL injection Commonly known as:  LEVEMIR  
0.2 mL by SubCUTAneous route nightly. * LEVEMIR FLEXTOUCH 100 unit/mL (3 mL) Inpn Generic drug:  insulin detemir  
  
 losartan 50 mg tablet Commonly known as:  COZAAR Take  by mouth daily. multivitamin, tx-iron-ca-min 9 mg iron-400 mcg Tab tablet Commonly known as:  THERA-M w/ IRON Take 1 Tab by mouth daily. MUSE 1,000 mcg Supp Generic drug:  alprostadil 1 Suppository by Urethral route as needed. pantoprazole 40 mg tablet Commonly known as:  PROTONIX Take 40 mg by mouth daily. PLAVIX 75 mg Tab Generic drug:  clopidogrel Take 75 mg by mouth daily. TRADJENTA 5 mg tablet Generic drug:  linagliptin Take 5 mg by mouth daily. vit B Cmplx 3-FA-Vit C-Biotin 1- mg-mg-mcg tablet Commonly known as:  NEPHRO ANTOLIN RX Take 1 Tab by mouth daily. VITAMIN D3 2,000 unit Tab Generic drug:  cholecalciferol (vitamin D3) Take  by mouth. * Notice: This list has 4 medication(s) that are the same as other medications prescribed for you. Read the directions carefully, and ask your doctor or other care provider to review them with you. Prescriptions Sent to Pharmacy Refills  
 cilostazol (PLETAL) 100 mg tablet 6 Sig: Take 1 Tab by mouth Before breakfast and dinner. Class: Normal  
 Pharmacy: [address], [address] Ph #: [phone] Route: Oral  
  
Follow-up Instructions Return in about 1 year (around 7/25/2018). To-Do List   
 07/25/2018 Imaging:  DUPLEX CAROTID BILATERAL AMB   
  
 07/25/2018 Imaging:  VAS DUPLEX LOW EXT ARTERY BILAT AMB [facility]! Dear Cameral: 
Thank you for requesting a [employer] account. Our records indicate that you already have an active [employer] account. You can access your account anytime at [email or web address] Lily Did you know that you can access your hospital and ER discharge instructions at any time in [employer]? You can also review all of your test results from your hospital stay or ER visit. Additional Information If you have questions, please visit the Frequently Asked Questions section of the [employer] website at [email or web address] Remember, [employer] is NOT to be used for urgent needs. For medical emergencies, dial 911. Now available from your iPhone and Android! Please provide this summary of care documentation to your next provider. Your primary care clinician is listed as Adore Lincoln. If you have any questions after today's visit, please call [phone].

## 2018-05-29 ENCOUNTER — HOSPITAL ENCOUNTER (OUTPATIENT)
Dept: VASCULAR SURGERY | Age: 78
Discharge: HOME | End: 2018-05-29
Attending: INTERNAL MEDICINE
Payer: MEDICARE

## 2018-05-29 DIAGNOSIS — I73.9 PERIPHERAL VASCULAR DISEASE, UNSPECIFIED (HCC): ICD-10-CM

## 2018-05-29 PROCEDURE — 93923 UPR/LXTR ART STDY 3+ LVLS: CPT

## 2018-05-29 NOTE — PROCEDURES
[facility]  *** FINAL REPORT ***    Name: Jacy Valladares  MRN: [id number]    Outpatient  : 19 Sep 1940  HIS Order #: [id number]  [address] [facility] Visit #: [id number]  Date: 29 May 2018    TYPE OF TEST: Peripheral Arterial Testing    REASON FOR TEST    Right Leg  Segmentals: Normal                     mmHg  Brachial         140  High thigh  Low thigh  Calf             118  Posterior tibial 193  Dorsalis pedis   139  Peroneal  Metatarsal  Toe pressure      97  Doppler:  Ankle/Brachial: 1.32    Left Leg  Segmentals: Normal                     mmHg  Brachial         146  High thigh  Low thigh  Calf             113  Posterior tibial 240  Dorsalis pedis    94  Peroneal  Metatarsal  Toe pressure      10  Doppler:  Ankle/Brachial: 1.64    INTERPRETATION/FINDINGS  Right Le. No evidence of significant peripheral arterial disease at rest in  the right leg. The right ankle/brachial index is 1.32. Left Le. Biphasic Doppler noted in the posterior tibial and monophasic  Doppler waveform noted in the dorsalis pedis artery. the left posterior   tibial artery is calcified. The left ankle/brachial index is 1.64 and  erroneous. The level of disease is femoral-popliteal segment. ADDITIONAL COMMENTS  No significant change from previous exam done in 17. I have personally reviewed the data relevant to the interpretation of  this  study.     TECHNOLOGIST: Katt Mcgrath, [facility], RVT/  Signed: 2018 04:14 PM    PHYSICIAN: Nolan Leahy MD  Signed: 2018 04:57 PM

## 2018-07-23 DIAGNOSIS — I73.9 PERIPHERAL ARTERIAL DISEASE (HCC): Primary | ICD-10-CM

## 2018-08-07 ENCOUNTER — OFFICE VISIT (OUTPATIENT)
Dept: VASCULAR SURGERY | Age: 78
End: 2018-08-07

## 2018-08-07 VITALS
SYSTOLIC BLOOD PRESSURE: 124 MMHG | HEART RATE: 100 BPM | HEIGHT: 70 IN | BODY MASS INDEX: 34.07 KG/M2 | RESPIRATION RATE: 17 BRPM | WEIGHT: 238 LBS | DIASTOLIC BLOOD PRESSURE: 86 MMHG

## 2018-08-07 DIAGNOSIS — M54.10 RADICULOPATHY, UNSPECIFIED SPINAL REGION: ICD-10-CM

## 2018-08-07 DIAGNOSIS — G62.9 NEUROPATHY: ICD-10-CM

## 2018-08-07 DIAGNOSIS — I73.9 PAD (PERIPHERAL ARTERY DISEASE) (HCC): Primary | ICD-10-CM

## 2018-08-07 DIAGNOSIS — E11.59 TYPE 2 DIABETES MELLITUS WITH OTHER CIRCULATORY COMPLICATIONS (HCC): ICD-10-CM

## 2018-08-07 NOTE — MR AVS SNAPSHOT
[address] [place] [address] 395 200 [facility] [address] 
[phone] Patient: Masha Lobe MRN: [id number] Visit Information Date & Time Provider Department Dept. Phone Encounter #  
 8/7/2018  2:15 PM Nichole Mike N [facility] [id number] Follow-up Instructions Return in about 1 year (around 8/7/2019). Your Appointments 8/8/2019  2:45 PM  
PROCEDURE with [facility] IMAGING 1 [facility] ([address] [place] [address]) Appt Note: bypass graft knaak 1 year [facility] [address] [address] 200 [facility] [address]  
[phone] Lesia Roldan Anil 172 47 [facility]  
  
    
 8/8/2019  3:45 PM  
PROCEDURE with [facility] NONIMAGING [facility] ([address] [place] [address]) Appt Note: dagoberto knaak 1 year 2300 [facility] [address] 200 [facility] [address]  
[phone] 2300 [facility]  
  
    
 8/22/2019  2:15 PM  
Follow Up with OLIVN [facility] ([address] [place] [address]) Appt Note: 1 year follow up after studies 2300 [facility] [place] [address] 200 [facility] [address]  
[phone] 2300 [facility] [place] [address] [facility] [address] Upcoming Health Maintenance Date Due  
 LIPID PANEL Q1 1940 FOOT EXAM Q1 9/19/1949 MICROALBUMIN Q1 9/19/1949 EYE EXAM RETINAL OR DILATED Q1 9/19/1949 DTaP/Tdap/Td series (1 - Tdap) 9/19/1960 ZOSTER VACCINE AGE 60> 7/19/1999 GLAUCOMA SCREENING Q2Y 9/19/2004 Pneumococcal 65+ High/Highest Risk (1 of 2 - PCV13) 9/19/2004 HEMOGLOBIN A1C Q6M 10/16/2015 MEDICARE YEARLY EXAM 3/14/2018 Influenza Age 5 to Adult 8/1/2018 Allergies as of 8/7/2018  Review Complete On: 8/7/2018 By: Sammi Baker Severity Noted Reaction Type Reactions Amlodipine    Swelling Gabapentin  04/16/2015    Hives Lyrica [Pregabalin]    Swelling Orange Juice    Rash Pollen Extracts  01/27/2016    Other (comments) Rash Away  [Zinc Oxide-white Petrolatum]  01/27/2016    Rash Current Immunizations  Never Reviewed No immunizations on file. Not reviewed this visit You Were Diagnosed With   
  
 Codes Comments PAD (peripheral artery disease) ([facility])    -  Primary ICD-10-CM: I73.9 ICD-9-CM: 443. 9 Type 2 diabetes mellitus with other circulatory complications ([facility])     VJV-66-WC: E11.59 
ICD-9-CM: 250.70 Vitals BP Pulse Resp Height(growth percentile) Weight(growth percentile) BMI  
 124/86 (BP 1 Location: Left arm, BP Patient Position: Sitting) 100 17 5' 10\" (1.778 m) 238 lb (108 kg) 34.15 kg/m2 Smoking Status Former Smoker Vitals History BMI and BSA Data Body Mass Index Body Surface Area  
 34.15 kg/m 2 2.31 m 2 Preferred Pharmacy Pharmacy Name Phone Rosemary Duarte, [facility] [phone] Your Updated Medication List  
  
   
This list is accurate as of 8/7/18  2:40 PM.  Always use your most recent med list.  
  
  
  
  
 CIALIS 20 mg tablet Generic drug:  tadalafil Take 20 mg by mouth as needed. * cilostazol 100 mg tablet Commonly known as:  PLETAL Take 1 Tab by mouth Before breakfast and dinner. * cilostazol 100 mg tablet Commonly known as:  PLETAL Take 1 Tab by mouth Before breakfast and dinner. CONTOUR NEXT TEST STRIPS strip Generic drug:  glucose blood VI test strips CRESTOR 10 mg tablet Generic drug:  rosuvastatin Take 10 mg by mouth nightly. FLOMAX 0.4 mg capsule Generic drug:  tamsulosin Take 0.4 mg by mouth daily. glimepiride 4 mg tablet Commonly known as:  AMARYL Take 8 mg by mouth every morning. HUMALOG KWIKPEN SC  
by SubCUTAneous route. hydroCHLOROthiazide 25 mg tablet Commonly known as:  HYDRODIURIL Take 25 mg by mouth daily. * insulin detemir U-100 100 unit/mL injection Commonly known as:  LEVEMIR  
0.2 mL by SubCUTAneous route nightly. * LEVEMIR FLEXTOUCH U-100 INSULN 100 unit/mL (3 mL) Inpn Generic drug:  insulin detemir U-100  
  
 losartan 50 mg tablet Commonly known as:  COZAAR Take  by mouth daily. multivitamin, tx-iron-ca-min 9 mg iron-400 mcg Tab tablet Commonly known as:  THERA-M w/ IRON Take 1 Tab by mouth daily. MUSE 1,000 mcg Supp Generic drug:  alprostadil 1 Suppository by Urethral route as needed. pantoprazole 40 mg tablet Commonly known as:  PROTONIX Take 40 mg by mouth daily. PLAVIX 75 mg Tab Generic drug:  clopidogrel Take 75 mg by mouth daily. TRADJENTA 5 mg tablet Generic drug:  linagliptin Take 5 mg by mouth daily. vit B Cmplx 3-FA-Vit C-Biotin 1- mg-mg-mcg tablet Commonly known as:  NEPHRO ANTOLIN RX Take 1 Tab by mouth daily. VITAMIN D3 2,000 unit Tab Generic drug:  cholecalciferol (vitamin D3) Take  by mouth. * Notice: This list has 4 medication(s) that are the same as other medications prescribed for you. Read the directions carefully, and ask your doctor or other care provider to review them with you. Follow-up Instructions Return in about 1 year (around 8/7/2019). To-Do List   
 08/07/2018 Vascular/US:  DUPLEX LOW EXT ARTERIES WITH AI Introducing [facility]! Dear Reid: 
Thank you for requesting a [employer] account. Our records indicate that you already have an active [employer] account. You can access your account anytime at [email or web address] Did you know that you can access your hospital and ER discharge instructions at any time in [employer]? You can also review all of your test results from your hospital stay or ER visit. Additional Information If you have questions, please visit the Frequently Asked Questions section of the [employer] website at [email or web address] Remember, [employer] is NOT to be used for urgent needs. For medical emergencies, dial 911. Now available from your iPhone and Android! Please provide this summary of care documentation to your next provider. Your primary care clinician is listed as Hamzah Pineda. If you have any questions after today's visit, please call [phone].

## 2018-08-08 NOTE — PROGRESS NOTES
Reid Henderson    Chief Complaint   Patient presents with    Leg Pain       History and Physical    Mr Maria Dougherty came initially at the request of both his PCP and spine specialist to evaluate PAD  He does have known radiculopathy and diabetic neuropathy knowingly contributing to bilateral leg pain  But he had reported pain in his lower legs and feet particularly with walking and was relieved with rest. It had been progressive over the years, and by the time he came here, was limited to less than one block of ambulation.  He denied then and continues to deny any symptoms of rest pain, and he has had no history of diabetic foot problems/ulcers He does follow regularly with a podiatrist.     We had discussed medical therapy - and actually added pletal  We did discuss angiogram, but he wasn't thrilled with idea of a procedure      Overall, he felt that his symptoms improved with the pletal. 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    Acute renal failure ([address] N17.9    Lumbar pain M54.5    Lumbar spinal stenosis M48.061    Facet arthritis of lumbar region ([address] M46.96    Neuropathy G62.9    Peripheral arterial disease (HCC) I73.9    Muscle spasm of back M62.830    Benign essential HTN I10    Dyslipidemia, goal LDL below 100 E78.5    SOB (shortness of breath) on exertion R06.02    CKD (chronic kidney disease) N18.9    Type 2 diabetes mellitus with other circulatory complications ([facility]) H01.06    PAD (peripheral artery disease) ([facility]) I73.9    Chronic pain syndrome G89.4    Spondylosis of lumbar region without myelopathy or radiculopathy M47.816    Lumbar facet arthropathy ([facility]) M46.96    Lumbar degenerative disc disease M51.36     Past Surgical History:   Procedure Laterality Date    HX HEENT       Current Outpatient Prescriptions   Medication Sig Dispense Refill    cholecalciferol, vitamin D3, (VITAMIN D3) 2,000 unit tab Take  by mouth.  cilostazol (PLETAL) 100 mg tablet Take 1 Tab by mouth Before breakfast and dinner. 180 Tab 3    vit B Cmplx 3-FA-Vit C-Biotin (NEPHRO ANTOLIN RX) 1- mg-mg-mcg tablet Take 1 Tab by mouth daily.  linagliptin (TRADJENTA) 5 mg tablet Take 5 mg by mouth daily.  CONTOUR NEXT STRIPS strip       LEVEMIR FLEXTOUCH 100 unit/mL (3 mL) pen       hydrochlorothiazide (HYDRODIURIL) 25 mg tablet Take 25 mg by mouth daily.  losartan (COZAAR) 50 mg tablet Take  by mouth daily.  multivitamin, tx-iron-ca-min (THERA-M W/ IRON) 9 mg iron-400 mcg tab tablet Take 1 Tab by mouth daily.  INSULIN LISPRO (HUMALOG KWIKPEN SC) by SubCUTAneous route.  insulin detemir (LEVEMIR) 100 unit/mL injection 0.2 mL by SubCUTAneous route nightly. 1 Vial 1    tadalafil (CIALIS) 20 mg tablet Take 20 mg by mouth as needed.  rosuvastatin (CRESTOR) 10 mg tablet Take 10 mg by mouth nightly.  glimepiride (AMARYL) 4 mg tablet Take 8 mg by mouth every morning.  clopidogrel (PLAVIX) 75 mg tablet Take 75 mg by mouth daily.  tamsulosin (FLOMAX) 0.4 mg capsule Take 0.4 mg by mouth daily.  cilostazol (PLETAL) 100 mg tablet Take 1 Tab by mouth Before breakfast and dinner. 180 Tab 6    pantoprazole (PROTONIX) 40 mg tablet Take 40 mg by mouth daily.  alprostadil (MUSE) 1,000 mcg supp 1 Suppository by Urethral route as needed.        Allergies   Allergen Reactions    Amlodipine Swelling    Gabapentin Hives    Lyrica [Pregabalin] Swelling    Orange Juice Rash    Pollen Extracts Other (comments)    Rash Away  [Zinc Oxide-White Petrolatum] Rash       Review of Systems    Review of Systems - History obtained from the patient  General ROS: negative  Ophthalmic ROS: negative  Psychological ROS: negative  Endocrine ROS: diabetic neuropathy  Respiratory ROS: negative  Cardiovascular ROS: negative  Gastrointestinal ROS: negative  Musculoskeletal ROS: positive for - pain in back - lower  Neurological ROS: no TIA or stroke symptoms  Dermatological ROS: negative  Vascular ROS: claudication    Physical   Visit Vitals    /86 (BP 1 Location: Left arm, BP Patient Position: Sitting)    Pulse 100    Resp 17    Ht 5' 10\" (1.778 m)    Wt 238 lb (108 kg)    BMI 34.15 kg/m2     General:  Alert, cooperative, no distress. Head:  Normocephalic, without obvious abnormality, atraumatic. Eyes:     Conjunctivae/corneas clear. Pupils equal, round, reactive to light. Extraocular movements intact. Neck:         No bruits   Lungs:   Clear to auscultation bilaterally. Heart:  Regular rate and rhythm, S1, S2 normal   Extremities: Extremities normal, atraumatic, no cyanosis or edema. Pulses: Distal pulses nonpalpable but no ischemic changes   Skin: Skin color, texture, turgor normal. No rashes or lesions         Vascular studies:  Bilateral mild stenosis less than 50% in the internal carotid arteries  Bilateral normal vertebral arterial blood flow normal bilateral subclavian arterial blood flow    Right Lower Arterial   Monophasic Doppler waveforms in the right anterior tibial artery. Biphasic Doppler waveforms in the right proximal common femoral, proximal superficial femoral, middle superficial femoral, distal superficial femoral, proximal popliteal, distal popliteal, posterior tibial and peroneal artery. Diffuse calcific plaquing noted throughout the arteries assessed.   Patent right common femoral, femoral and popliteal arteries in the segments accessible to scan. Biphasic signals noted. Right posterior tibial and peroneal arteries patent without significant stenosis in the segments accessible to scan. Poorly biphasic signals noted. Right anterior tibial artery patent with monophasic signals. Right AI waveforms suggest moderate arterial insufficiency at rest. The AI may be not reliable due to evidence of arterial calcification. The right AI is 1.55. Left Lower Arterial   Monophasic Doppler waveforms in the left proximal popliteal, distal popliteal, anterior tibial, posterior tibial and peroneal artery. Biphasic Doppler waveforms in the left proximal superficial femoral, middle superficial femoral and distal superficial femoral artery. Triphasic Doppler waveforms in the left proximal common femoral artery. Diffuse calcific plaquing noted throughout the arteries assessed. Elevated velocities noted in the mid thigh segment of the left femoral artery suggesting severe stenosis due to narrowing. Patent left popliteal artery without stenosis. Monophasic signals noted. Left posterior tibial artery and peroneal artery patent without stenosis. Distal left anterior tibial artery patent, unable to detect Doppler signals in the main segments. Monophasic signals noted. Left AI waveforms suggest moderate arterial insufficiency at rest. Left AI may not be reliable due to evidence of arterial calcification. The left AI is 1.55. Impression/Plan:     ICD-10-CM ICD-9-CM    1. PAD (peripheral artery disease) ([facility]) I73.9 443.9 DUPLEX LOW EXT ARTERIES WITH AI   2. Type 2 diabetes mellitus with other circulatory complications ([facility]) D21.79 250.70 DUPLEX LOW EXT ARTERIES WITH AI   3. Neuropathy G62.9 355.9    4. Radiculopathy, unspecified spinal region M54.10 729.2      As we have done previously, we reviewed his results and different degrees of symptoms of peripheral arterial disease.   He feels that the Pletal is working well for him, and he is not experiencing any rest pain, has no tissue loss, and mobility is good. He feels mostly limited by his back. But he does acknowledge an understanding that if he has symptoms or concerns that he feels would reflect claudication and he is ready to consider angiogram, to call and we will get him back in sooner to further discuss treatment plan. Otherwise we will continue yearly surveillance. Total time today mostly which was discussion was 20-25 minutes    Follow-up Disposition:  Return in about 1 year (around 8/7/2019). OLVIN Reza    Portions of this note have been entered using voice recognition software.

## 2019-08-22 ENCOUNTER — OFFICE VISIT (OUTPATIENT)
Dept: VASCULAR SURGERY | Age: 79
End: 2019-08-22

## 2019-08-22 VITALS
SYSTOLIC BLOOD PRESSURE: 152 MMHG | DIASTOLIC BLOOD PRESSURE: 80 MMHG | RESPIRATION RATE: 18 BRPM | WEIGHT: 238 LBS | HEART RATE: 110 BPM | HEIGHT: 70 IN | BODY MASS INDEX: 34.07 KG/M2

## 2019-08-22 DIAGNOSIS — E11.59 TYPE 2 DIABETES MELLITUS WITH OTHER CIRCULATORY COMPLICATIONS (HCC): ICD-10-CM

## 2019-08-22 DIAGNOSIS — I73.9 PAD (PERIPHERAL ARTERY DISEASE) (HCC): Primary | ICD-10-CM

## 2019-08-22 DIAGNOSIS — I65.23 CAROTID STENOSIS, ASYMPTOMATIC, BILATERAL: ICD-10-CM

## 2019-08-22 PROBLEM — E11.40 TYPE 2 DIABETES MELLITUS WITH DIABETIC NEUROPATHY (HCC): Status: ACTIVE | Noted: 2019-08-22

## 2019-08-22 NOTE — PROGRESS NOTES
Reid Henderson    Chief Complaint   Patient presents with    Leg Pain       History and Physical    Mr Henry Arriaga came initially at the request of both his PCP and spine specialist to evaluate PAD  He does have known radiculopathy and diabetic neuropathy knowingly contributing to bilateral leg pain  But he had reported pain in his lower legs and feet particularly with walking and was relieved with rest. It had been progressive over the years, and by the time he came here, was limited to less than one block of ambulation. He denied then and continues to deny any symptoms of rest pain, and he has had no history of diabetic foot problems/ulcers He does follow regularly with a podiatrist.     We had discussed medical therapy - and actually added pletal  We did discuss angiogram, but he wasn't thrilled with idea of a procedure      Overall, he felt that his symptoms improved with the pletal. We agreed to continue current treatment regimen with the pletal/plavix; continue exercise routine, and at least establish surveillance, for which he is here today    He does not fact pretty much give me the same description of symptoms.   And he again states that he is really not interested in having to have any types of procedures done after I had thoroughly explained what would be involved with an angiogram    Past Medical History:   Diagnosis Date    Arthritis     Diabetes ([address]     Hypercholesteremia     Hypertension     Lumbar pain     Prostate disease     Sleep apnea      Patient Active Problem List   Diagnosis Code    Dehydration E86.0    Acute renal failure (ARF) ([address] N17.9    Diarrhea R19.7    Hyperkalemia, diminished renal excretion Z97.9    Metabolic acidosis W53.5    Diabetes 1.5, 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    Acute renal failure ([address] N17.9    Lumbar pain M54.5    Lumbar spinal stenosis M48.061    Facet arthritis of lumbar region M47.816    Neuropathy G62.9    Peripheral arterial disease ([address] I73.9    Muscle spasm of back M62.830    Benign essential HTN I10    Dyslipidemia, goal LDL below 100 E78.5    SOB (shortness of breath) on exertion R06.02    CKD (chronic kidney disease) N18.9    Type 2 diabetes mellitus with other circulatory complications ([facility]) X04.78    PAD (peripheral artery disease) ([facility]) I73.9    Chronic pain syndrome G89.4    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 180 Tab 6    pantoprazole (PROTONIX) 40 mg tablet Take 40 mg by mouth daily.  cholecalciferol, vitamin D3, (VITAMIN D3) 2,000 unit tab Take  by mouth.  cilostazol (PLETAL) 100 mg tablet Take 1 Tab by mouth Before breakfast and dinner. 180 Tab 3    vit B Cmplx 3-FA-Vit C-Biotin (NEPHRO ANTOLIN RX) 1- mg-mg-mcg tablet Take 1 Tab by mouth daily.  linagliptin (TRADJENTA) 5 mg tablet Take 5 mg by mouth daily.  CONTOUR NEXT STRIPS strip       LEVEMIR FLEXTOUCH 100 unit/mL (3 mL) pen       hydrochlorothiazide (HYDRODIURIL) 25 mg tablet Take 25 mg by mouth daily.  losartan (COZAAR) 50 mg tablet Take  by mouth daily.  multivitamin, tx-iron-ca-min (THERA-M W/ IRON) 9 mg iron-400 mcg tab tablet Take 1 Tab by mouth daily.  INSULIN LISPRO (HUMALOG KWIKPEN SC) by SubCUTAneous route.  insulin detemir (LEVEMIR) 100 unit/mL injection 0.2 mL by SubCUTAneous route nightly. 1 Vial 1    tadalafil (CIALIS) 20 mg tablet Take 20 mg by mouth as needed.  rosuvastatin (CRESTOR) 10 mg tablet Take 10 mg by mouth nightly.  glimepiride (AMARYL) 4 mg tablet Take 8 mg by mouth every morning.       alprostadil (MUSE) 1,000 mcg supp 1 Suppository by Urethral route as needed.  clopidogrel (PLAVIX) 75 mg tablet Take 75 mg by mouth daily.  tamsulosin (FLOMAX) 0.4 mg capsule Take 0.4 mg by mouth daily. Allergies   Allergen Reactions    Amlodipine Swelling    Gabapentin Hives    Lyrica [Pregabalin] Swelling    Orange Juice Rash    Pollen Extracts Other (comments)    Rash Away  [Zinc Oxide-White Petrolatum] Rash       Review of Systems    Review of Systems - History obtained from the patient  General ROS: negative  Ophthalmic ROS: negative  Psychological ROS: negative  Endocrine ROS: diabetic neuropathy  Respiratory ROS: negative  Cardiovascular ROS: negative  Gastrointestinal ROS: negative  Musculoskeletal ROS: positive for - pain in back - lower  Neurological ROS: no TIA or stroke symptoms  Dermatological ROS: negative  Vascular ROS: claudication    Physical   Visit Vitals  /80 (BP 1 Location: Left arm, BP Patient Position: Sitting)   Pulse (!) 110   Resp 18   Ht 5' 10\" (1.778 m)   Wt 238 lb (108 kg)   BMI 34.15 kg/m²     General:  Alert, cooperative, no distress. Head:  Normocephalic, without obvious abnormality, atraumatic. Eyes:     Conjunctivae/corneas clear. Pupils equal, round, reactive to light. Extraocular movements intact. Neck:         No bruits   Lungs:   Clear to auscultation bilaterally. Heart:  Regular rate and rhythm, S1, S2 normal   Extremities: Extremities normal, atraumatic, no cyanosis or edema. Pulses: Distal pulses nonpalpable but no ischemic changes   Skin: Skin color, texture, turgor normal. No rashes or lesions     Vascular studies:  Right Lower Arterial     Monophasic Doppler waveforms in the right posterior tibial, peroneal and dorsalis pedis artery.  Biphasic Doppler waveforms in the right distal common femoral, profunda femoris, proximal superficial femoral, middle superficial femoral, distal superficial femoral, proximal popliteal and distal popliteal artery. Diffuse calcific plaquing visualized thoroughout the RLE. Calf vessels are heavily calcified with poor penetration. Left Lower Arterial     The left middle superficial femoral artery demonstrates moderate (50-75%) stenosis. The left middle superficial femoral artery has moderate calcific plaque. Monophasic Doppler waveforms in the left posterior tibial and peroneal artery. Biphasic Doppler waveforms in the left distal common femoral, profunda femoris, proximal superficial femoral, middle superficial femoral, distal superficial femoral, proximal popliteal and distal popliteal artery. Doppler waveforms are absent in the anterior tibial artery. Diffuse plaquing visualized throughout the LLE arteries. Focal moderate to severe stenosis in the proximal to mid SFA with elevated PSV from 51 cm/sec to 471 cm/sec with a poorly biphasic doppler signal suggestive of upper end of 50-75% criteria. Cannot exclude more significant stenosis in poorly penetrated areas with calcific plaque. Calf vessels are heavily calcific with poor penetration. The mid SAMUEL is asonic and appears occluded. AI     The right resting AI is moderately decreased. The left resting AI is severely decreased. The right anterior tibial artery and posterior tibial artery has monophasic waveforms. Right toe PPG is dampened. The left anterior tibial artery and posterior tibial artery has monophasic waveforms. Left toe PPG is dampened. Moderate disease suggested by waveform analysis in the right leg  Severe disease suggested by waveform analysis in the left leg     The exam was compared to the study performed on 7/24/2018. No significant change bilaterally. Impression/Plan:     ICD-10-CM ICD-9-CM    1. PAD (peripheral artery disease) ([facility]) I73.9 443.9 DUPLEX LOWER EXT ARTERY BILAT   2. Type 2 diabetes mellitus with other circulatory complications ([facility]) S86.10 250.70 DUPLEX LOWER EXT ARTERY BILAT   3.  Carotid stenosis, asymptomatic, bilateral I65.23 433.10 DUPLEX CAROTID BILATERAL     433.30      No orders of the defined types were placed in this encounter. I would say is hard to discern primary cause of his leg symptoms, since he does have probably a component of radiculopathy and neuropathy, but also the PAD. Certainly we could offer treatment beyond medical therapy, starting with angiogram with possible intervention, but also possible surgery. As stated above he really wants to avoid any type of procedures. I discussed that his disease range may remain completely stable, but it could get worse as well. We have had patients in his situation come in with acute pain in her left with no revascularization options and ultimately opted for amputation. But sometimes we are also successful with revascularization. He states he feels like he would recognize symptoms that if he had progression and worsening of pain he would let us know. He agreed to continue with surveillance and good risk factor control and continue good follow-up with his podiatrist for preventive foot care. So we will see him back with repeat leg studies including another carotid by next year  Follow-up and Dispositions    · Return in about 1 year (around 8/22/2020). OLVIN Mccoy    Portions of this note have been entered using voice recognition software.

## 2019-08-22 NOTE — PROGRESS NOTES
1. Have you been to an emergency room or urgent care clinic since your last visit?   no  Hospitalized since your last visit? If yes, where, when, and reason for visit?   no  2. Have you seen or consulted any other health care providers outside of the [facility] since your last visit including any procedures, health maintenance items. If yes, where, when and reason for visit?

## 2020-11-29 ENCOUNTER — HOSPITAL ENCOUNTER (EMERGENCY)
Age: 80
Discharge: HOME | End: 2020-11-29
Attending: EMERGENCY MEDICINE | Admitting: EMERGENCY MEDICINE
Payer: MEDICARE

## 2020-11-29 ENCOUNTER — APPOINTMENT (OUTPATIENT)
Dept: GENERAL RADIOLOGY | Age: 80
End: 2020-11-29
Attending: EMERGENCY MEDICINE
Payer: MEDICARE

## 2020-11-29 VITALS
TEMPERATURE: 98.3 F | WEIGHT: 220 LBS | SYSTOLIC BLOOD PRESSURE: 129 MMHG | BODY MASS INDEX: 30.8 KG/M2 | RESPIRATION RATE: 18 BRPM | HEART RATE: 61 BPM | HEIGHT: 71 IN | DIASTOLIC BLOOD PRESSURE: 74 MMHG | OXYGEN SATURATION: 96 %

## 2020-11-29 DIAGNOSIS — S76.012A HIP STRAIN, LEFT, INITIAL ENCOUNTER: ICD-10-CM

## 2020-11-29 DIAGNOSIS — M25.552 HIP PAIN, LEFT: Primary | ICD-10-CM

## 2020-11-29 PROCEDURE — 74011000250 HC RX REV CODE- 250: Performed by: EMERGENCY MEDICINE

## 2020-11-29 PROCEDURE — 99283 EMERGENCY DEPT VISIT LOW MDM: CPT

## 2020-11-29 PROCEDURE — 73502 X-RAY EXAM HIP UNI 2-3 VIEWS: CPT

## 2020-11-29 RX ORDER — LIDOCAINE 50 MG/G
PATCH TOPICAL
Qty: 10 EACH | Refills: 0 | Status: SHIPPED | OUTPATIENT
Start: 2020-11-29 | End: 2022-01-14

## 2020-11-29 RX ORDER — RABEPRAZOLE SODIUM 20 MG/1
20 TABLET, DELAYED RELEASE ORAL DAILY
COMMUNITY

## 2020-11-29 RX ORDER — LEVOTHYROXINE SODIUM 25 UG/1
25 TABLET ORAL
COMMUNITY

## 2020-11-29 RX ORDER — LIDOCAINE 4 G/100G
1 PATCH TOPICAL EVERY 24 HOURS
Status: DISCONTINUED | OUTPATIENT
Start: 2020-11-29 | End: 2020-11-30 | Stop reason: HOSPADM

## 2020-11-30 NOTE — ED NOTES
Pt reports no head injury during the modified fall, also has no other noted injuries or wounds or deformities noted. PMS and ROM intact to all extremities. MD notified.

## 2020-11-30 NOTE — ED TRIAGE NOTES
Pt reports a fall while walking with a walker 3 weeks ago when he lowered himself to the ground and sat on his left hip. Pt. Complains of left hip pain off and on with history of neuropathy. Pt has no obvious deformities or abnormal rotation. Pt is ambulatory per EMS.

## 2020-11-30 NOTE — ED PROVIDER NOTES
HPI Patient is a 78 yo male who reports having a fall while walking with a walker 3 weeks ago. He states he develop left hip pain when he tripped. He then lowered himself to the ground and sat on his left hip. Pt. Since then he states she has hadleft hip pain off and on. He has a hx of neuropathy in his left leg. He states he needs and MRI of his hip tonight and presents to the ER to have it done. Patient had  no obvious deformities or abnormal rotation upon arrival and was ambulatory per EMS upon their arrival to patient home. .    Past Medical History:   Diagnosis Date    Arthritis     [facility]     Hypercholesteremia     Hypertension     Lumbar pain     Prostate disease     Sleep apnea        Past Surgical History:   Procedure Laterality Date    HX HEENT           Family History:   Problem Relation Age of Onset    Cancer Mother     Cancer Sister     Emphysema Brother        Social History     Socioeconomic History    Marital status:      Spouse name: Not on file    Number of children: Not on file    Years of education: Not on file    Highest education level: Not on file   Occupational History    Not on file   Social Needs    Financial resource strain: Not on file    Food insecurity     Worry: Not on file     Inability: Not on file    Transportation needs     Medical: Not on file     Non-medical: Not on file   Tobacco Use    Smoking status: Former Smoker     Last attempt to quit: 10/12/1989     Years since quittin.1    Smokeless tobacco: Never Used   Substance and Sexual Activity    Alcohol use: Yes     Comment: occas    Drug use: No    Sexual activity: Not on file   Lifestyle    Physical activity     Days per week: Not on file     Minutes per session: Not on file    Stress: Not on file   Relationships    Social connections     Talks on phone: Not on file     Gets together: Not on file     Attends Restorationism service: Not on file     Active member of club or organization: Not on file     Attends meetings of clubs or organizations: Not on file     Relationship status: Not on file    Intimate partner violence     Fear of current or ex partner: Not on file     Emotionally abused: Not on file     Physically abused: Not on file     Forced sexual activity: Not on file   Other Topics Concern    Not on file   Social History Narrative    Not on file         ALLERGIES: Amlodipine; Gabapentin; Lyrica [pregabalin]; Orange juice; Pollen extracts; and Rash away  [zinc oxide-white petrolatum]    Review of Systems   Constitutional: Negative. HENT: Negative. Eyes: Negative. Respiratory: Negative. Cardiovascular: Negative. Gastrointestinal: Negative. Endocrine: Negative. Genitourinary: Negative. Musculoskeletal: Negative. Skin: Negative. Allergic/Immunologic: Negative. Neurological: Negative. Hematological: Negative. Psychiatric/Behavioral: Negative. All other systems reviewed and are negative. Vitals:    11/29/20 2005   BP: 129/74   Pulse: 61   Resp: 18   Temp: 98.3 °F (36.8 °C)   SpO2: 96%   Weight: 99.8 kg (220 lb)   Height: 5' 11\" (1.803 m)            Physical Exam  Vitals signs and nursing note reviewed. Constitutional:       General: He is not in acute distress. Appearance: He is well-developed. HENT:      Head: Normocephalic. Eyes:      Conjunctiva/sclera: Conjunctivae normal.      Pupils: Pupils are equal, round, and reactive to light. Neck:      Musculoskeletal: Normal range of motion and neck supple. Cardiovascular:      Rate and Rhythm: Normal rate and regular rhythm. Heart sounds: Normal heart sounds. No murmur. Pulmonary:      Effort: Pulmonary effort is normal. No respiratory distress. Breath sounds: Normal breath sounds. No wheezing or rales. Chest:      Chest wall: No tenderness. Abdominal:      General: Bowel sounds are normal. There is no distension. Palpations: Abdomen is soft. Tenderness: There is no abdominal tenderness. There is no rebound. Musculoskeletal: Normal range of motion. General: No tenderness. Comments: LEFT HIP: normal ROM, pulses and sensory. Skin:     General: Skin is warm and dry. Findings: No rash. Neurological:      Mental Status: He is alert and oriented to person, place, and time. Cranial Nerves: No cranial nerve deficit. Motor: No abnormal muscle tone. Coordination: Coordination normal.   Psychiatric:         Behavior: Behavior normal.         Thought Content: Thought content normal.         Judgment: Judgment normal.          MDM       Procedures    Dx: Hip pain, left hip strain    Disp: D/C home. F/U ortho in 2 to 3 days. Rx: lidoderm patch. Return to ER prn. Dictation disclaimer:  Please note that this dictation was completed with [employer], the computer voice recognition software. Quite often unanticipated grammatical, syntax, homophones, and other interpretive errors are inadvertently transcribed by the computer software. Please disregard these errors. Please excuse any errors that have escaped final proofreading.

## 2020-11-30 NOTE — DISCHARGE INSTRUCTIONS
Patient Education        Hip Pain: Care Instructions  Your Care Instructions     Hip pain may be caused by many things, including overuse, a fall, or a twisting movement. Another cause of hip pain is arthritis. Your pain may increase when you stand up, walk, or squat. The pain may come and go or may be constant. Home treatment can help relieve hip pain, swelling, and stiffness. If your pain is ongoing, you may need more tests and treatment. Follow-up care is a key part of your treatment and safety. Be sure to make and go to all appointments, and call your doctor if you are having problems. It's also a good idea to know your test results and keep a list of the medicines you take. How can you care for yourself at home? · Take pain medicines exactly as directed. ? If the doctor gave you a prescription medicine for pain, take it as prescribed. ? If you are not taking a prescription pain medicine, ask your doctor if you can take an over-the-counter medicine. · Rest and protect your hip. Take a break from any activity, including standing or walking, that may cause pain. · Put ice or a cold pack against your hip for 10 to 20 minutes at a time. Try to do this every 1 to 2 hours for the next 3 days (when you are awake) or until the swelling goes down. Put a thin cloth between the ice and your skin. · Sleep on your healthy side with a pillow between your knees, or sleep on your back with pillows under your knees. · If there is no swelling, you can put moist heat, a heating pad, or a warm cloth on your hip. Do gentle stretching exercises to help keep your hip flexible. · Learn how to prevent falls. Have your vision and hearing checked regularly. Wear slippers or shoes with a nonskid sole. · Stay at a healthy weight. · Wear comfortable shoes. When should you call for help? Call 911 anytime you think you may need emergency care.  For example, call if:    · You have sudden chest pain and shortness of breath, or you cough up blood.     · You are not able to stand or walk or bear weight.     · Your buttocks, legs, or feet feel numb or tingly.     · Your leg or foot is cool or pale or changes color.     · You have severe pain. Call your doctor now or seek immediate medical care if:    · You have signs of infection, such as:  ? Increased pain, swelling, warmth, or redness in the hip area. ? Red streaks leading from the hip area. ? Pus draining from the hip area. ? A fever.     · You have signs of a blood clot, such as:  ? Pain in your calf, back of the knee, thigh, or groin. ? Redness and swelling in your leg or groin.     · You are not able to bend, straighten, or move your leg normally.     · You have trouble urinating or having bowel movements. Watch closely for changes in your health, and be sure to contact your doctor if:    · You do not get better as expected. Where can you learn more? Go to http://www.gray.com/  Enter Z720 in the search box to learn more about \"Hip Pain: Care Instructions. \"  Current as of: June 26, 2019               Content Version: 12.6  © 6269-4724 [employer]. Care instructions adapted under license by [employer] (which disclaims liability or warranty for this information). If you have questions about a medical condition or this instruction, always ask your healthcare professional. [place] [address] any warranty or liability for your use of this information.

## 2021-02-04 ENCOUNTER — HOSPITAL ENCOUNTER (EMERGENCY)
Age: 81
Discharge: HOME | End: 2021-02-04
Attending: EMERGENCY MEDICINE
Payer: MEDICARE

## 2021-02-04 ENCOUNTER — APPOINTMENT (OUTPATIENT)
Dept: CT IMAGING | Age: 81
End: 2021-02-04
Attending: EMERGENCY MEDICINE
Payer: MEDICARE

## 2021-02-04 VITALS
RESPIRATION RATE: 20 BRPM | WEIGHT: 220 LBS | OXYGEN SATURATION: 97 % | DIASTOLIC BLOOD PRESSURE: 80 MMHG | BODY MASS INDEX: 31.5 KG/M2 | HEART RATE: 95 BPM | TEMPERATURE: 97.9 F | HEIGHT: 70 IN | SYSTOLIC BLOOD PRESSURE: 144 MMHG

## 2021-02-04 DIAGNOSIS — M54.16 LUMBAR RADICULAR PAIN: ICD-10-CM

## 2021-02-04 DIAGNOSIS — G89.29 CHRONIC LEFT HIP PAIN: Primary | ICD-10-CM

## 2021-02-04 DIAGNOSIS — M25.552 CHRONIC LEFT HIP PAIN: Primary | ICD-10-CM

## 2021-02-04 DIAGNOSIS — M54.32 SCIATICA OF LEFT SIDE: ICD-10-CM

## 2021-02-04 LAB — GLUCOSE BLD STRIP.AUTO-MCNC: 103 MG/DL (ref 70–110)

## 2021-02-04 PROCEDURE — 82962 GLUCOSE BLOOD TEST: CPT

## 2021-02-04 PROCEDURE — 96372 THER/PROPH/DIAG INJ SC/IM: CPT

## 2021-02-04 PROCEDURE — 73700 CT LOWER EXTREMITY W/O DYE: CPT

## 2021-02-04 PROCEDURE — 99283 EMERGENCY DEPT VISIT LOW MDM: CPT

## 2021-02-04 PROCEDURE — 72131 CT LUMBAR SPINE W/O DYE: CPT

## 2021-02-04 RX ORDER — MORPHINE SULFATE 4 MG/ML
6 INJECTION, SOLUTION INTRAMUSCULAR; INTRAVENOUS
Status: DISCONTINUED | OUTPATIENT
Start: 2021-02-04 | End: 2021-02-04 | Stop reason: HOSPADM

## 2021-02-05 NOTE — ED PROVIDER NOTES
EMERGENCY DEPARTMENT HISTORY AND PHYSICAL EXAM    4:22  PM      Date: 2/4/2021  Patient Name: Alda Diaz    History of Presenting Illness     Chief Complaint   Patient presents with    Hip Pain         History Provided By: Patient    Additional History (Context): Reid Leonardo is a 79 y.o. male with Past medical history of arthritis, diabetes, hypertension, lumbar pain who presents with chief complaint of left hip pain worsening over the past 6 months. Also complains of low back pain for the same amount of time worsening. He states that pain radiates down his left lower extremity and his  pain is severe. Patient was sent to the ED by his PCP Dr. Nereida Diana me stating that the patient had a fall a month ago and he wanted to get a CT scan to evaluate. He states that he had x-rays ready. They have states that since the patient fell he is not been able to stand or walk well without severe pain. However, she states that he actually fell over 6 months ago but did not tell Dr Zachery Cantu until recently. Patient states that he has been on multiple types of pain medications and also has been to pain management. 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 States that when he stands he does get pain down his left lower extremity. He reports that when he lays flat he sometimes gets relief that way. He denies any fever, bowel or bladder dysfunction, dizziness, neck pain, weakness, syncope, no saddle anesthesia, difficulty urinating, dysuria, and no other complaints.     PCP: Josee Gonsalez MD        Past History     Past Medical History:  Past Medical History:   Diagnosis Date    Arthritis     Diabetes ([address]     Hypercholesteremia     Hypertension     Lumbar pain     Prostate disease     Sleep apnea        Past Surgical History:  Past Surgical History:   Procedure Laterality Date    HX HEENT         Family History:  Family History   Problem Relation Age of Onset    Cancer Mother     Cancer Sister     Emphysema Brother        Social History:  Social History     Tobacco Use    Smoking status: Former Smoker     Quit date: 10/12/1989     Years since quittin.3    Smokeless tobacco: Never Used   Substance Use Topics    Alcohol use: Yes     Comment: occas    Drug use: No       Allergies: Allergies   Allergen Reactions    Amlodipine Swelling    Gabapentin Hives    Lyrica [Pregabalin] Swelling    Orange Juice Rash    Pollen Extracts Other (comments)    Rash Away  [Zinc Oxide-White Petrolatum] Rash         Review of Systems       Review of Systems   Constitutional: Negative for chills and fever. HENT: Negative for trouble swallowing. Respiratory: Negative for cough, shortness of breath and wheezing. Cardiovascular: Negative for chest pain and leg swelling. Gastrointestinal: Negative for abdominal pain, nausea and vomiting. Endocrine: Negative for polyuria. Genitourinary: Negative for difficulty urinating, dysuria and flank pain. Musculoskeletal: Positive for arthralgias and back pain. Negative for neck pain and neck stiffness. Low back pain and left hip pain   Skin: Negative for rash. Neurological: Negative for dizziness, syncope, weakness, numbness and headaches. Hematological: Does not bruise/bleed easily. Psychiatric/Behavioral: Negative for confusion and dysphoric mood. All other systems reviewed and are negative. Physical Exam     Visit Vitals  BP (!) 144/80   Pulse 95   Temp 97.9 °F (36.6 °C)   Resp 20   Ht 5' 10\" (1.778 m)   Wt 99.8 kg (220 lb)   SpO2 97%   BMI 31.57 kg/m²         Physical Exam  Vitals signs and nursing note reviewed. Constitutional:       General: He is not in acute distress. Appearance: He is well-developed. He is not diaphoretic. HENT:      Head: Normocephalic and atraumatic.    Eyes:      General: No scleral icterus. Conjunctiva/sclera: Conjunctivae normal.      Pupils: Pupils are equal, round, and reactive to light. Neck:      Musculoskeletal: Normal range of motion and neck supple. No muscular tenderness. Cardiovascular:      Rate and Rhythm: Normal rate. Pulses: Normal pulses. Heart sounds: Normal heart sounds. Pulmonary:      Effort: Pulmonary effort is normal. No respiratory distress. Breath sounds: Normal breath sounds. Abdominal:      General: There is no distension. Musculoskeletal: Normal range of motion. General: Tenderness and signs of injury (6 months ago with a fall) present. Comments: Tender to palpate midline lumbar region but no bony step-offs or crepitance    Mild deep tenderness of left hip    Good range of motion bilateral upper and lower extremities       Skin:     General: Skin is warm and dry. Capillary Refill: Capillary refill takes 2 to 3 seconds. Neurological:      General: No focal deficit present. Mental Status: He is alert and oriented to person, place, and time. Cranial Nerves: No cranial nerve deficit. Sensory: No sensory deficit. Motor: No weakness. Coordination: Coordination normal.      Deep Tendon Reflexes: Reflexes normal.      Comments: Patient with strength 5 out of 5 bilateral upper and lower extremities    Has good range of motion bilateral upper and lower extremities   Psychiatric:         Thought Content: Thought content normal.           Diagnostic Study Results     Labs -  Recent Results (from the past 12 hour(s))   GLUCOSE, POC    Collection Time: 02/04/21  7:58 PM   Result Value Ref Range    Glucose (POC) 103 70 - 110 mg/dL       Radiologic Studies -   CT SPINE LUMB WO CONT   Final Result   1. Multilevel degenerative changes with likely severe neural foraminal   narrowing of L5-S1.       2.  Likely severe canal stenosis at L4-L5. CT HIP LT WO CONT   Final Result   1.   No evidence of acute fracture or dislocation. 2.  Moderate degenerative changes of the left hip. Medical Decision Making   I am the first provider for this patient. I reviewed the vital signs, available nursing notes, past medical history, past surgical history, family history and social history. Vital Signs-Reviewed the patient's vital signs. Records Reviewed: Nursing Notes and Old Medical Records (Time of Review: 8:12 PM)    Provider Notes (Medical Decision Making): DDX: Arthritis, subluxation, fracture, chronic pain, strain, spinal stenosis, degenerative disc disease, bulging disks    We will get CT hip and lumbar spine, give analgesia  MDM    Medications   morphine injection 6 mg (6 mg IntraMUSCular Refused 2/4/21 8405)         ED Course: Progress Notes, Reevaluation, and Consults:  Reassessed patient he states he does not want any pain medication now. He reports that he has pain medication. States he only wants the scans done. Patient was disgruntled about his wait. I as well as RN notified him regarding the acuity of some other patients and I apologized for his wait. He states that he understood    Talked with patient about a system problem with CT scan crossing over to where radiologist could see it. RN did give patient a snack and something to drink. Blood sugar was 103    7:47 PM still waiting on radiologist to call me back with prelim report as I have contacted them regarding issue with CT report crossing over. Dr. Noel Gonzalez stated he will call me back with prelim read. Patient decided that he wants to go home and just have Dr. Janene Man discussed with him about his report. I believe that this is reasonable as he actually has had the symptoms for 6 months. There are no red flags for cord compression. I did give him follow-up referral for [facility]. He states that he will consider seeing them once he gets understanding about the scan reports.     I have reassessed the patient. I have discussed the workup, results and plan with the patient and patient is in agreement. Patient was discharge in stable condition. Patient was given outpatient follow up. Patient is to return to emergency department if any new or worsening condition. CT report is back after patient has gone. He does have some severe neuroforaminal stenosis and also some lumbar spinal stenosis. Degenerative changes of the left hip. I called patient on his listed cell phone. He gave correct patient identifiers. I explained to him regarding his CT report. And that he needs to contact [facility] on his paperwork as I had discussed at discharge. He was very thankful for my call and explanation of the CT scans. Diagnosis     Clinical Impression:   1. Chronic left hip pain    2. Lumbar radicular pain    3. Sciatica of left side        Disposition: Discharged    Follow-up Information     Follow up With Specialties Details Why Contact Info    Armaan Rocha MD Internal Medicine Schedule an appointment as soon as possible for a visit in 1 day  [address]      [id number] [place] [address] EMERGENCY DEPT Emergency Medicine  As needed, If symptoms worsen [address]  Schedule an appointment as soon as possible for a visit in 2 days  [facility]  [phone]           Discharge Medication List as of 2/4/2021  8:10 PM      CONTINUE these medications which have NOT CHANGED    Details   levothyroxine (synthroid) 25 mcg tablet Take 25 mcg by mouth Daily (before breakfast). , Historical Med      RABEprazole (ACIPHEX) 20 mg tablet Take 20 mg by mouth daily. , Historical Med      lidocaine (Lidoderm) 5 % Apply patch to the affected area for 12 hours a day and remove for 12 hours a day., Print, Disp-10 Each,R-0      !! cilostazol (PLETAL) 100 mg tablet Take 1 Tab by mouth Before breakfast and dinner., Normal, Disp-180 Tab, R-6      pantoprazole (PROTONIX) 40 mg tablet Take 40 mg by mouth daily. , Historical Med      cholecalciferol, vitamin D3, (VITAMIN D3) 2,000 unit tab Take  by mouth., Historical Med      !! cilostazol (PLETAL) 100 mg tablet Take 1 Tab by mouth Before breakfast and dinner., Normal, Disp-180 Tab, R-3      vit B Cmplx 3-FA-Vit C-Biotin (NEPHRO ANTOLIN RX) 1- mg-mg-mcg tablet Take 1 Tab by mouth daily. , Historical Med      linagliptin (TRADJENTA) 5 mg tablet Take 5 mg by mouth daily. , Historical Med      CONTOUR NEXT STRIPS strip Historical Med      LEVEMIR FLEXTOUCH 100 unit/mL (3 mL) pen Historical Med      hydrochlorothiazide (HYDRODIURIL) 25 mg tablet Take 25 mg by mouth daily. , Historical Med      losartan (COZAAR) 50 mg tablet Take  by mouth daily. , Historical Med      multivitamin, tx-iron-ca-min (THERA-M W/ IRON) 9 mg iron-400 mcg tab tablet Take 1 Tab by mouth daily. , Historical Med      INSULIN LISPRO (HUMALOG KWIKPEN SC) by SubCUTAneous route., Historical Med      insulin detemir (LEVEMIR) 100 unit/mL injection 0.2 mL by SubCUTAneous route nightly. , Print, Disp-1 Vial, R-1      tadalafil (CIALIS) 20 mg tablet Take 20 mg by mouth as needed., Historical Med      rosuvastatin (CRESTOR) 10 mg tablet Take 10 mg by mouth nightly., Historical Med      glimepiride (AMARYL) 4 mg tablet Take 8 mg by mouth every morning., Historical Med      alprostadil (MUSE) 1,000 mcg supp 1 Suppository by Urethral route as needed., Historical Med      clopidogrel (PLAVIX) 75 mg tablet Take 75 mg by mouth daily. , Historical Med      tamsulosin (FLOMAX) 0.4 mg capsule Take 0.4 mg by mouth daily. , Historical Med       !! - Potential duplicate medications found. Please discuss with provider. Artemio Cancer, DO    Dragon medical dictation software was used for portions of this report. Unintended transcription errors may occur. My signature above authenticates this document and my orders, the final    diagnosis (es), discharge prescription (s), and instructions in the Epic    record.

## 2021-02-05 NOTE — ROUTINE PROCESS
Nieves Pittman is a 79 y.o. male that was discharged in stable. Pt was accompanied by spouse. Pt is not driving. The patients diagnosis, condition and treatment were explained to  patient and aftercare instructions were given. The patient verbalized understanding. Patient armband removed and shredded.

## 2021-02-08 ENCOUNTER — OFFICE VISIT (OUTPATIENT)
Dept: ORTHOPEDIC SURGERY | Age: 81
End: 2021-02-08
Payer: MEDICARE

## 2021-02-08 VITALS
SYSTOLIC BLOOD PRESSURE: 133 MMHG | BODY MASS INDEX: 31.57 KG/M2 | DIASTOLIC BLOOD PRESSURE: 67 MMHG | TEMPERATURE: 97.6 F | HEART RATE: 96 BPM | RESPIRATION RATE: 20 BRPM | HEIGHT: 70 IN

## 2021-02-08 DIAGNOSIS — M48.061 SPINAL STENOSIS AT L4-L5 LEVEL: ICD-10-CM

## 2021-02-08 DIAGNOSIS — M54.16 LEFT LUMBAR RADICULOPATHY: ICD-10-CM

## 2021-02-08 DIAGNOSIS — M21.372 LEFT FOOT DROP: ICD-10-CM

## 2021-02-08 DIAGNOSIS — M48.061 SPINAL STENOSIS AT L4-L5 LEVEL: Primary | ICD-10-CM

## 2021-02-08 PROCEDURE — 99204 OFFICE O/P NEW MOD 45 MIN: CPT | Performed by: PHYSICAL MEDICINE & REHABILITATION

## 2021-02-08 RX ORDER — GABAPENTIN 100 MG/1
CAPSULE ORAL
COMMUNITY
Start: 2021-01-28 | End: 2021-02-08 | Stop reason: ALTCHOICE

## 2021-02-08 RX ORDER — GABAPENTIN 300 MG/1
300 CAPSULE ORAL 3 TIMES DAILY
Qty: 90 CAP | Refills: 0 | Status: SHIPPED | OUTPATIENT
Start: 2021-02-08 | End: 2021-05-17

## 2021-02-08 NOTE — PROGRESS NOTES
[address]  [address], [address]  [facility], [address]  Phone: [phone]  Fax: [phone]      Patient: Alda Diaz                                                                            MRN: [id number]        YOB: 1940        AGE: 79 y.o. SEX: male    PCP: Josee Gonsalez MD  Date:  02/08/21    Reason for Consultation: New Patient, Back Pain, and Leg Pain (Left)      HPI:  Reid Leonardo is a 79 y.o. male with relevant PMH of DM2 with neuropathy, PAD on plavix and pletal, HTN,  CKD   who presents with low back pain and right hip pain. 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 Since that time he has had chronic low back pain radiating down bilateral lower extremities L>R. He was seen here by Dr. Tg Brandon in 2017 and referred to consider RFA with Dr. Aaron Fox who felt it was  due to the fact that he was on plavix and had multiple co morbidities that the risk outweighed the potential benefit. Symptoms acutely worsened after a fall 10/2020- he did not tell anyone initially but was sent to ED recently by his PCP to r/o fracture. He has been taking gabapentin 100mg for his peripheral neuropathy and his PCP recently increased it to 300mg TID. Neurologic symptoms: No numbness, tingling, weakness, bowel or bladder changes. No recent falls      Location: The pain is located in the low back radiating down L>R  Radiation: The pain does radiate left leg anterior and posterior to the foot, at times down the right . Pain Score: Currently: 10/10    Quality: Pain is of a Achy, Burning, Stabbing, Tingling, Numbness, Dull, Tight and Pulling quality. Aggravating: Pain is exacerbated by lying down flat on back  Alleviating:  The pain is alleviated by lying down on left side    Prior Treatments:   Pain management Dr. Kory Trent Dr [facility]- considered RFA but not recommended at that time  NO PT recently- but will start home PT soon  Previous Medications:    Current Medications: Gabapentin increased to 300mg TID  Can't take NSAIDS -CKD  Previous work-up has included:   CT left hip 2/4/2021--no fracture- moderate arthritis    CT lumbar spine 2/4/2021  Disc osteophyte formation and facet arthropathy are noted at multiple levels. Grossly unchanged moderate canal stenosis at the L2-L3 level. Likely severe  canal stenosis is noted at L4-L5. Potential moderate to severe neural foraminal  narrowing. The L5-S1 level demonstrates foraminal extraforaminal disc  protrusions which compresses the neural foramen. X-ray left hip 11/2020-Small nonspecific calcifications and/or ossifications noted adjacent to the left acetabulum and about the greater trochanter.  Advanced atherosclerosis grossly similar.       MRI lumbar spine 2016  L2/3:  Moderately severe disc height loss with diffuse disc bulge with  osteophyte complex. Moderate facet and ligamentous hypertrophy. Severe central  canal stenosis, progressed from prior. Severe right neural foramen stenosis with  right foraminal protrusion impinging on the right L2 foraminal nerve root. Moderate left neural foraminal stenosis similar to prior] hypertrophy contacting  the left foraminal nerve root.     L3/4:  Moderately severe disc height loss with diffuse disc bulge and osteophyte  complex. Moderate facet and ligamentum hypertrophy with prominent epidural fat. Moderate to severe central stenosis, similar to prior. Moderate right and mild  left foraminal stenosis similar to prior.     L4/5:  Trace grade 1 anterolisthesis and mild disc height loss. Moderate facet  and ligamentous hypertrophy. Decrease in size of the previously seen disc  herniation. Moderate central stenosis improved from prior. Moderate bilateral  foraminal stenosis progressed on the right since comparison.      L5/S1:  Mild central disc protrusion. Moderately severe facet hypertrophy. Patent central canal. Mild foraminal stenosis. Overall similar to prior.     Asymmetric right psoas muscular atrophy at L3 level. No prevertebral soft tissue  edema or phlegmon. No retroperitoneal adenopathy. 2018- lower extremity ABIs-  ABIs are unreliable bilaterally secondary to medial calcinosis  Moderate to severe arterial disease noted bilaterally  Disease is located within the femoral-popliteal and tibial segment bilaterally  Moderate stenosis noted within the right popliteal artery  Moderate to severe stenosis noted within the left superficial femoral artery    Past Medical History:   Past Medical History:   Diagnosis Date    Arthritis     Diabetes ([address]     Hypercholesteremia     Hypertension     Lumbar pain     Prostate disease     Sleep apnea       Past Surgical History:   Past Surgical History:   Procedure Laterality Date    HX HEENT        SocHx:   Social History     Tobacco Use    Smoking status: Former Smoker     Quit date: 10/12/1989     Years since quittin.3    Smokeless tobacco: Never Used   Substance Use Topics    Alcohol use: Yes     Comment: linnette      FamHx:? Family History   Problem Relation Age of Onset    Cancer Mother     Cancer Sister     Emphysema Brother        Current Medications:    Current Outpatient Medications   Medication Sig Dispense Refill    levothyroxine (synthroid) 25 mcg tablet Take 25 mcg by mouth Daily (before breakfast).  RABEprazole (ACIPHEX) 20 mg tablet Take 20 mg by mouth daily.  lidocaine (Lidoderm) 5 % Apply patch to the affected area for 12 hours a day and remove for 12 hours a day. 10 Each 0    cilostazol (PLETAL) 100 mg tablet Take 1 Tab by mouth Before breakfast and dinner. 180 Tab 6    pantoprazole (PROTONIX) 40 mg tablet Take 40 mg by mouth daily.  cholecalciferol, vitamin D3, (VITAMIN D3) 2,000 unit tab Take  by mouth.       cilostazol (PLETAL) 100 mg tablet Take 1 Tab by mouth Before breakfast and dinner. 180 Tab 3   • vit B Cmplx 3-FA-Vit C-Biotin (NEPHRO ANTOLIN RX) 1- mg-mg-mcg tablet Take 1 Tab by mouth daily.     • linagliptin (TRADJENTA) 5 mg tablet Take 5 mg by mouth daily.     • CONTOUR NEXT STRIPS strip      • LEVEMIR FLEXTOUCH 100 unit/mL (3 mL) pen      • hydrochlorothiazide (HYDRODIURIL) 25 mg tablet Take 25 mg by mouth daily.     • losartan (COZAAR) 50 mg tablet Take  by mouth daily.     • multivitamin, tx-iron-ca-min (THERA-M W/ IRON) 9 mg iron-400 mcg tab tablet Take 1 Tab by mouth daily.     • INSULIN LISPRO (HUMALOG KWIKPEN SC) by SubCUTAneous route.     • insulin detemir (LEVEMIR) 100 unit/mL injection 0.2 mL by SubCUTAneous route nightly. 1 Vial 1   • tadalafil (CIALIS) 20 mg tablet Take 20 mg by mouth as needed.     • rosuvastatin (CRESTOR) 10 mg tablet Take 10 mg by mouth nightly.     • glimepiride (AMARYL) 4 mg tablet Take 8 mg by mouth every morning.     • alprostadil (MUSE) 1,000 mcg supp 1 Suppository by Urethral route as needed.     • clopidogrel (PLAVIX) 75 mg tablet Take 75 mg by mouth daily.     • tamsulosin (FLOMAX) 0.4 mg capsule Take 0.4 mg by mouth daily.        Allergies:    Allergies   Allergen Reactions   • Amlodipine Swelling   • Gabapentin Hives   • Lyrica [Pregabalin] Swelling   • Orange Juice Rash   • Pollen Extracts Other (comments)   • Rash Away  [Zinc Oxide-White Petrolatum] Rash        Review of Systems:   Gen:    Denied fevers, chills, malaise, fatigue, weight changes   Resp: Denied shortness of breath, cough, wheezing   CVS: Denied chest pain, palpitations   : Denied urinary urgency, frequency, incontinence   GI: Denied nausea, vomiting, constipation, diarrhea   Skin: Denied rashes, wounds   Psych: Denied anxiety, depression   Vasc: Denied claudication, ulcers   Hem: Denied easy bruising/bleeding   MSK: See HPI   Neuro: See HPI         Physical Exam     Vital Signs:   Visit Vitals  /67   Pulse 96   Temp 97.6 °F (36.4 °C) (Temporal)  Resp 20   Ht 5' 10\" (1.778 m)   BMI 31.57 kg/m²      General: ??????? Well nourished and well developed male without any acute distress   Psychiatric: ?  Alert and oriented x 3 with normal mood    HEENT: ???????? Atraumatic   Respiratory:   Breathing non-labored and non dyspneic   CV: ???????????????? Peripheral pulses intact, no peripheral edema   Skin: ????????????? No rashes- left foot erythema, around the great toe, no skin ulceration,+ swelling left foot 1+      Neurologic: ??       Sensation: normal and grossly intact thebilateral, lower extremity(s) slightly diminished sensation to light touch b/l feet  Strength: 5/5 in the bilateral, lower extremity(s) except left foot DF 3/5, EHL 3/5, PF 3/5, knee extension 4/5   Reflexes: absent b/l achilles and patella  Gait: antalgic gait with rollator, unable to walk without assisted device  Upper tract signs: Babinski down going    Musculoskeletal: Lumbar Exam     Inspection:   Alignment: Abnormal    Atrophy: atrophy left calf  Single leg stance: Abnormal unable to perform        Tenderness to Palpation:   Lumbar paraspinals Positive  Lumbar spinous processes Positive  SI Joint:  Positive{  Gluteal:Positive   Greater trochanter: Negative    ROM:   Lumbar ROM: Abnormal- restricted lumbar ROM, pain with flexion and extension, worse with extension  Lumbar facet loading: Positive  Hip ROM: No reproduction of pain with movement     Special Tests      Slump test: Positive    Medical Decision Making:    Images:   CT lumbar spine- severe spinal stenosis L4/5 severe b/l foraminal narrowing, facet arthrosis, L5/S1 disc bulge with b/l foraminal narrowing  CT hip-mild hip OA  MRI lumbar spine 2016- L2/3 severe spinal stenosis with right foraminal narrowing, L3/4 severe central stenosis, L4/5 moderate central stenosis b/l foraminal narrowing, L5/S1 severe facet arthrosis  Labs:  HbA1C- 2015- 8.6  LFTS unremarkable  Cr- 1.74 2016  ABIs 2018- moderate to severe arterial disease       Assessment:   1. Lumbar spondylosis  2. Spinal stenosis  3. Left lumbar radiculopathy  4. Diabetes  5. Peripheral vascular disease  6. Left foot drop    Plan:      -Physical therapy -  Referral to PT to start lower extremity strengthening, gait training, fall prevention  -Medications - continue gabapentin 300mg TID- was prescribed by PCP but did not go through, renewed rx. Has history in chart of allergy but has been taking gabapentin 300mg without issue. Counseled regarding side effects and appropriate administration of medications.    -Diagnostics/Imaging - MRI lumbar spine ordered, reviewed imaging from ED with patient  -Injections - consider repeat lumbar GABRIELA after imaging-will need to be cautious  With h/o DM and is currently on plavic  -Bracing- referral for AFO, has home PT coming, will see if we can get fitted through PT  - Needs to f/u with vascular - due to covid missed last years appt. Does have left foot pain known PAD  -Follow regularly with podiatrist given DM, peripheral neuropathy and PAD  -Education - The patient's diagnosis, prognosis and treatment options were discussed today. All questions were answered. F/U - in 3 week(s) to review MRI or sooner if needed.   Consider referral for GABRIELA         [facility]

## 2021-02-18 ENCOUNTER — OFFICE VISIT (OUTPATIENT)
Dept: VASCULAR SURGERY | Age: 81
End: 2021-02-18
Payer: MEDICARE

## 2021-02-18 VITALS
DIASTOLIC BLOOD PRESSURE: 70 MMHG | SYSTOLIC BLOOD PRESSURE: 142 MMHG | HEART RATE: 88 BPM | HEIGHT: 70 IN | BODY MASS INDEX: 31.5 KG/M2 | OXYGEN SATURATION: 98 % | RESPIRATION RATE: 22 BRPM | WEIGHT: 220 LBS

## 2021-02-18 DIAGNOSIS — I70.262 ATHEROSCLEROSIS OF NATIVE ARTERIES OF EXTREMITIES WITH GANGRENE, LEFT LEG (HCC): Primary | ICD-10-CM

## 2021-02-18 PROCEDURE — 99215 OFFICE O/P EST HI 40 MIN: CPT | Performed by: SURGERY

## 2021-02-18 NOTE — LETTER
NOTIFICATION OF RETURN TO WORK / SCHOOL 
 
2/18/2021 2:35 PM 
 
Mr. Reid Latif [address] [facility] [address] [place] D[place] To Whom It May Concern: 
 
Reid Latif was under the care of [place] from 02/22/21- 02/24/21. Care giver will be able to return to work/school on 02/25/21. If there are questions or concerns please have the patient contact our office.  
 
Sincerely, 
 
 
Benedict Dawson MD

## 2021-02-18 NOTE — LETTER
NOTIFICATION OF RETURN TO WORK / SCHOOL 
 
2/18/2021 2:34 PM 
 
Mr. Reid Vera [address] [facility] [address] [facility] To Whom It May Concern: 
 
Reid Vera was under the care of [place] on 02/18/21. If there are questions or concerns please have the patient contact our office.  
 
Sincerely, 
 
 
Rosemary Webster MD

## 2021-02-18 NOTE — PROGRESS NOTES
Reid Henderson    Chief Complaint   Patient presents with    Leg Pain       History and Physical    Reid MALORIE Balbuena is a 79 y.o. gentleman with history of diabetes, hypertension and hyper lipidemia. He also has known radiculopathy and diabetic neuropathy contributing to bilateral leg pain. He has previously been seen in our office for bilateral lower extremity claudication and was initiated on Pletal which appeared to help. He now returns in follow up. He is no longer on Pletal. He is a former smoker. He states he tripped in October 2020 and stubbed his toes. This led to some abrasions on the left 2nd and 3rd toes. These areas have not healed since then. He endorses severe pain in the left leg extending from the hip to the toes. The right side is less severe. Pain has been present at this severity for several months. Alleviated by removing pressure on foot and lying down. Also specifically endorses left metatarsalgia. Denies fevers or chills.      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    Acute renal failure (HCC) N17.9    Lumbar pain M54.5    Lumbar spinal stenosis M48.061    Facet arthritis of lumbar region M47.816    Neuropathy G62.9    Peripheral arterial disease (HCC) I73.9    Muscle spasm of back M62.830    Benign essential HTN I10    Dyslipidemia, goal LDL below 100 E78.5    SOB (shortness of breath) on exertion R06.02    CKD (chronic kidney disease) N18.9    Type 2 diabetes mellitus with other circulatory complications ([facility]) P83.53    PAD (peripheral artery disease) ([facility]) I73.9    Chronic pain syndrome G89.4    Spondylosis of lumbar region without myelopathy or radiculopathy M47.816    Lumbar facet arthropathy M47.816    Lumbar degenerative disc disease M51.36    Type 2 diabetes mellitus with diabetic neuropathy ([facility]) E11.40     Current Outpatient Medications   Medication Sig Dispense Refill    gabapentin (NEURONTIN) 300 mg capsule Take 1 Cap by mouth three (3) times daily for 30 days. Max Daily Amount: 900 mg. 90 Cap 0    levothyroxine (synthroid) 25 mcg tablet Take 25 mcg by mouth Daily (before breakfast).  RABEprazole (ACIPHEX) 20 mg tablet Take 20 mg by mouth daily.  lidocaine (Lidoderm) 5 % Apply patch to the affected area for 12 hours a day and remove for 12 hours a day. 10 Each 0    cilostazol (PLETAL) 100 mg tablet Take 1 Tab by mouth Before breakfast and dinner. 180 Tab 6    pantoprazole (PROTONIX) 40 mg tablet Take 40 mg by mouth daily.  cholecalciferol, vitamin D3, (VITAMIN D3) 2,000 unit tab Take  by mouth.  cilostazol (PLETAL) 100 mg tablet Take 1 Tab by mouth Before breakfast and dinner. 180 Tab 3    vit B Cmplx 3-FA-Vit C-Biotin (NEPHRO ANTOLIN RX) 1- mg-mg-mcg tablet Take 1 Tab by mouth daily.  linagliptin (TRADJENTA) 5 mg tablet Take 5 mg by mouth daily.  CONTOUR NEXT STRIPS strip       LEVEMIR FLEXTOUCH 100 unit/mL (3 mL) pen       hydrochlorothiazide (HYDRODIURIL) 25 mg tablet Take 25 mg by mouth daily.  losartan (COZAAR) 50 mg tablet Take  by mouth daily.  multivitamin, tx-iron-ca-min (THERA-M W/ IRON) 9 mg iron-400 mcg tab tablet Take 1 Tab by mouth daily.  INSULIN LISPRO (HUMALOG KWIKPEN SC) by SubCUTAneous route.  insulin detemir (LEVEMIR) 100 unit/mL injection 0.2 mL by SubCUTAneous route nightly. 1 Vial 1    tadalafil (CIALIS) 20 mg tablet Take 20 mg by mouth as needed.       rosuvastatin (CRESTOR) 10 mg tablet Take 10 mg by mouth nightly.  glimepiride (AMARYL) 4 mg tablet Take 8 mg by mouth every morning.  alprostadil (MUSE) 1,000 mcg supp 1 Suppository by Urethral route as needed.  clopidogrel (PLAVIX) 75 mg tablet Take 75 mg by mouth daily.  tamsulosin (FLOMAX) 0.4 mg capsule Take 0.4 mg by mouth daily.        Allergies   Allergen Reactions    Amlodipine Swelling    Gabapentin Hives    Lyrica [Pregabalin] Swelling    Orange Juice Rash    Pollen Extracts Other (comments)    Rash Away  [Zinc Oxide-White Petrolatum] Rash     Social History     Socioeconomic History    Marital status:      Spouse name: Not on file    Number of children: Not on file    Years of education: Not on file    Highest education level: Not on file   Occupational History    Not on file   Social Needs    Financial resource strain: Not on file    Food insecurity     Worry: Not on file     Inability: Not on file    Transportation needs     Medical: Not on file     Non-medical: Not on file   Tobacco Use    Smoking status: Former Smoker     Quit date: 10/12/1989     Years since quittin.3    Smokeless tobacco: Never Used   Substance and Sexual Activity    Alcohol use: Yes     Comment: occas    Drug use: No    Sexual activity: Not on file   Lifestyle    Physical activity     Days per week: Not on file     Minutes per session: Not on file    Stress: Not on file   Relationships    Social connections     Talks on phone: Not on file     Gets together: Not on file     Attends Orthodoxy service: Not on file     Active member of club or organization: Not on file     Attends meetings of clubs or organizations: Not on file     Relationship status: Not on file    Intimate partner violence     Fear of current or ex partner: Not on file     Emotionally abused: Not on file     Physically abused: Not on file     Forced sexual activity: Not on file   Other Topics Concern    Not on file   Social History Narrative    Not on file      Family History   Problem Relation Age of Onset    Cancer Mother     Cancer Sister     Emphysema Brother        Physical Exam:    Visit Vitals  Ht 5' 10\" (1.778 m)   Wt 220 lb (99.8 kg)   BMI 31.57 kg/m²      Constitutional:  Patient is well developed, nourished, and not distressed. HEENT:  normocephalic, atraumatic  Eyes:   Cunjunctiva normal, no xanthelasma   Neck:   No JVD present. There is no Carotid bruit. Cardiovascular:  Normal rate, regular rhythm, normal heart sounds. Pulses: no palpable pedal pulses. Doppler signals are present x4. Pulmonary/Chest: Effort normal and breath sounds normal.  he has no wheezes or no rales. Abdominal:  Bowel sounds are normal. Soft. Obese. No tenderness. Extremities: Normal range of motion. no edema. Digits no cyanosis or clubbing. There is dependent rubor involving the left forefoot. There is a dry, black eschar on the left 3rd toe, no drainage or surrounding erythema. A smaller, nonhealing pinpoint ulcer is found on the left 2nd toe. Neurological:  he  is alert and oriented x3 . Gait normal. Motor & sensory grossly intact in all 4 limbs;affect and mood normal.  Skin:  Skin is warm and dry. No rash noted. No erythema. Imaging - Noninvasive vascular studies were reviewed and discussed with the patient and his wife. Right Lower Arterial    Monophasic Doppler waveforms in the right posterior tibial, peroneal and dorsalis pedis artery. Biphasic Doppler waveforms in the right distal common femoral, profunda femoris, proximal superficial femoral, middle superficial femoral, distal superficial femoral, proximal popliteal and distal popliteal artery. Diffuse calcific plaquing visualized thoroughout the RLE. Calf vessels are heavily calcified with poor penetration. Left Lower Arterial    The left middle superficial femoral artery demonstrates moderate (50-75%) stenosis.  The left middle superficial femoral artery has moderate calcific plaque. Monophasic Doppler waveforms in the left posterior tibial and peroneal artery. Biphasic Doppler waveforms in the left distal common femoral, profunda femoris, proximal superficial femoral, middle superficial femoral, distal superficial femoral, proximal popliteal and distal popliteal artery. Doppler waveforms are absent in the anterior tibial artery. Diffuse plaquing visualized throughout the LLE arteries. Focal moderate to severe stenosis in the proximal to mid SFA with elevated PSV from 51 cm/sec to 471 cm/sec with a poorly biphasic doppler signal suggestive of upper end of 50-75% criteria. Cannot exclude more significant stenosis in poorly penetrated areas with calcific plaque. Calf vessels are heavily calcific with poor penetration. The mid SAMUEL is asonic and appears occluded. AI    The right resting AI is moderately decreased. The left resting AI is severely decreased. The right anterior tibial artery and posterior tibial artery has monophasic waveforms. Right toe PPG is dampened. The left anterior tibial artery and posterior tibial artery has monophasic waveforms. Left toe PPG is dampened. Moderate disease suggested by waveform analysis in the right leg  Severe disease suggested by waveform analysis in the left leg           Impression and Plan:  79 y.o. male with multiple comorbidities including insulin-dependent diabetes, CKD and HTN, now presents with left lower extremity critical limb threatening ischemia with ulceration of the left 2nd and 3rd toes. 1. Left lower extremity critical limb threatening ischemia (CLTI). I had a lengthy discussion with the patient regarding the natural history of CLTI which includes high risk of limb loss and death. I recommend urgent intervention in the form of an abdominal aortogram with left lower extremity runoff and possible intervention.  Risks and benefits of the procedure were discussed with the patient in detail and he opted to proceed. The procedure will be scheduled next week. Plavix will be continued through the procedure. 2. Chronic kidney disease. Most recent Cr 1.74 available in our system is from 2015. Patient states he had labs drawn at his PCPs a couple weeks ago. Will obtain records of those results. Patient will likely require CO2 imaging and hydration for his procedure. Tyrell Lauren MD       In excess of 45 minutes were spent in face-to-face time with this patient, more than 50% of which was devoted to discussing diagnosis, treatment plan and possible intervention. PLEASE NOTE:  This document has been produced using voice recognition software. Unrecognized errors in transcription may be present.

## 2021-02-23 DIAGNOSIS — I73.9 PERIPHERAL ARTERIAL DISEASE (HCC): Primary | ICD-10-CM

## 2021-02-25 ENCOUNTER — OFFICE VISIT (OUTPATIENT)
Dept: VASCULAR SURGERY | Age: 81
End: 2021-02-25

## 2021-02-25 VITALS
DIASTOLIC BLOOD PRESSURE: 80 MMHG | RESPIRATION RATE: 20 BRPM | HEART RATE: 98 BPM | OXYGEN SATURATION: 93 % | SYSTOLIC BLOOD PRESSURE: 130 MMHG

## 2021-02-25 DIAGNOSIS — I73.9 PERIPHERAL ARTERIAL DISEASE (HCC): Primary | ICD-10-CM

## 2021-02-25 LAB
LEFT GSV AT KNEE DIAM: 0.34 CM
LEFT GSV BK DIST DIAM: 0.42 CM
LEFT GSV BK MID DIAM: 0.28 CM
LEFT GSV BK PROX DIAM: 0.32 CM
LEFT GSV JUNC DIAM: 1.1 CM
LEFT GSV THIGH DIST DIAM: 0.38 CM
LEFT GSV THIGH MID DIAM: 0.31 CM
LEFT GSV THIGH PROX DIAM: 0.42 CM
RIGHT GSV BK DIST DIAM: 0.43 CM
RIGHT GSV BK MID DIAM: 0.23 CM
RIGHT GSV BK PROX DIAM: 0.24 CM
RIGHT GSV JUNC DIAM: 0.96 CM
RIGHT GSV THIGH DIST DIAM: 0.26 CM
RIGHT GSV THIGH MID DIAM: 0.33 CM
RIGHT GSV THIGH PROX DIAM: 0.38 CM

## 2021-02-25 PROCEDURE — 99214 OFFICE O/P EST MOD 30 MIN: CPT | Performed by: SURGERY

## 2021-02-25 NOTE — PROGRESS NOTES
Reid Garcia    2/25/2021     Chief Complaint   Patient presents with    Leg Pain       History and Physical    Mr. Patricia Romero returns to clinic today with his wife for discussion about his upcoming procedure. He previously saw Dr. Heather Noriega last week. Mr. Patricia Romero has shallow nonhealing ulcerations of the left toes and noninvasive evidence of severe peripheral arterial disease. He is using a wheelchair today. He states that he is able to go short distances around the home with a walker but otherwise uses a wheelchair. He describes this being secondary to severe back pain with radiation to the left leg which she has had for some time. Arianne the findings with regard to his arterial studies and circulation. I explained the left lower extremity angiogram and potential endovascular invention. However, Mr. Patricia Romero feels that he cannot lie flat for the procedure. I explained that we could do general anesthesia for the procedure itself but he would still need to remain flat 2 to 4 hours after the operation to prevent an access site complication. He states that he is unable to do this secondary to his longstanding back and leg pain. He says he is going to see if he is able to do this at home and be able to therefore have the procedure but that he has not been able to do this for some time. I explained that there is some chance that the left leg ulceration could progress and will be limb threatening, and he understands.     Past Medical History:   Diagnosis Date    Arthritis     Diabetes (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    Acute renal failure ([address] N17.9    Lumbar pain M54.5    Lumbar spinal stenosis M48.061    Facet arthritis of lumbar region M47.816    Neuropathy G62.9    Peripheral arterial disease (HCC) I73.9    Muscle spasm of back M62.830    Benign essential HTN I10    Dyslipidemia, goal LDL below 100 E78.5    SOB (shortness of breath) on exertion R06.02    CKD (chronic kidney disease) N18.9    Type 2 diabetes mellitus with other circulatory complications ([facility]) O46.85    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, left leg ([facility]) I70.262    Chronic pain syndrome G89.4    Spondylosis of lumbar region without myelopathy or radiculopathy M47.816    Lumbar facet arthropathy M47.816    Lumbar degenerative disc disease M51.36    Type 2 diabetes mellitus with diabetic neuropathy ([facility]) E11.40     Past Surgical History:   Procedure Laterality Date    HX HEENT      HX ROTATOR CUFF REPAIR Left      Current Outpatient Medications   Medication Sig Dispense Refill    gabapentin (NEURONTIN) 300 mg capsule Take 1 Cap by mouth three (3) times daily for 30 days. Max Daily Amount: 900 mg. 90 Cap 0    levothyroxine (synthroid) 25 mcg tablet Take 25 mcg by mouth Daily (before breakfast).  RABEprazole (ACIPHEX) 20 mg tablet Take 20 mg by mouth daily.  lidocaine (Lidoderm) 5 % Apply patch to the affected area for 12 hours a day and remove for 12 hours a day. 10 Each 0    cilostazol (PLETAL) 100 mg tablet Take 1 Tab by mouth Before breakfast and dinner. 180 Tab 6    pantoprazole (PROTONIX) 40 mg tablet Take 40 mg by mouth daily.  cholecalciferol, vitamin D3, (VITAMIN D3) 2,000 unit tab Take  by mouth.  cilostazol (PLETAL) 100 mg tablet Take 1 Tab by mouth Before breakfast and dinner. 180 Tab 3    vit B Cmplx 3-FA-Vit C-Biotin (NEPHRO ANTOLIN RX) 1- mg-mg-mcg tablet Take 1 Tab by mouth daily.  linagliptin (TRADJENTA) 5 mg tablet Take 5 mg by mouth daily.       CONTOUR NEXT STRIPS strip       LEVEMIR FLEXTOUCH 100 unit/mL (3 mL) pen       hydrochlorothiazide (HYDRODIURIL) 25 mg tablet Take 25 mg by mouth daily.  losartan (COZAAR) 50 mg tablet Take  by mouth daily.  multivitamin, tx-iron-ca-min (THERA-M W/ IRON) 9 mg iron-400 mcg tab tablet Take 1 Tab by mouth daily.  INSULIN LISPRO (HUMALOG KWIKPEN SC) by SubCUTAneous route.  insulin detemir (LEVEMIR) 100 unit/mL injection 0.2 mL by SubCUTAneous route nightly. 1 Vial 1    tadalafil (CIALIS) 20 mg tablet Take 20 mg by mouth as needed.  rosuvastatin (CRESTOR) 10 mg tablet Take 10 mg by mouth nightly.  glimepiride (AMARYL) 4 mg tablet Take 8 mg by mouth every morning.  alprostadil (MUSE) 1,000 mcg supp 1 Suppository by Urethral route as needed.  clopidogrel (PLAVIX) 75 mg tablet Take 75 mg by mouth daily.  tamsulosin (FLOMAX) 0.4 mg capsule Take 0.4 mg by mouth daily. Allergies   Allergen Reactions    Amlodipine Swelling    Gabapentin Hives    Lyrica [Pregabalin] Swelling    Orange Juice Rash    Pollen Extracts Other (comments)    Rash Away  [Zinc Oxide-White Petrolatum] Rash       ROS     Physical   Visit Vitals  /80 (BP 1 Location: Right arm, BP Patient Position: Sitting, BP Cuff Size: Adult)   Pulse 98   Resp 20   SpO2 93%       Physical Exam     Abdomen is obese soft nontender. Mr. Orlando Lowe is not able to lie flat at all on the examining table. He requires lifting one or both legs and even then cannot lie flat. 2+ femoral pulses. His left toes have some rubor. He has shallow ulcerations on the nailbed on the first toe and on the third toe on the dorsal aspect but no signs of infection    Impression/Plan:     ICD-10-CM ICD-9-CM    1. Peripheral arterial disease (HCC)  I73.9 443.9      He has toe ulceration and needs to see podiatry.   He states that he has his follow-up .given his inability to do an angiogram, his best option is maximize conservative measures  Follow-up and Dispositions    · Return if symptoms worsen or fail to improve. We agreed that he would return on a as needed basis if he thinks he is able to tolerate and pursue the procedure. Attestation: Time spent with this patient was 40 minutes, more than half of which was engaged in education regarding the condition and discussing the treatment plan face-to-face.     Justus Dawn MD

## 2021-02-25 NOTE — PROGRESS NOTES
1. Have you been to an emergency room or urgent care clinic since your last visit? No       Hospitalized since your last visit? If yes, where, when, and reason for visit? No     2. Have you seen or consulted any other health care providers outside of the [facility] since your last visit including any procedures, health maintenance items. If yes, where, when and reason for visit?  Yes ; pcp           3 most recent PHQ Screens 2/25/2021   Little interest or pleasure in doing things Not at all   Feeling down, depressed, irritable, or hopeless Not at all   Total Score PHQ 2 0

## 2021-03-01 ENCOUNTER — HOSPITAL ENCOUNTER (OUTPATIENT)
Age: 81
Discharge: HOME | End: 2021-03-01
Attending: PHYSICAL MEDICINE & REHABILITATION
Payer: MEDICARE

## 2021-03-01 PROCEDURE — 72148 MRI LUMBAR SPINE W/O DYE: CPT

## 2021-03-08 ENCOUNTER — OFFICE VISIT (OUTPATIENT)
Dept: ORTHOPEDIC SURGERY | Age: 81
End: 2021-03-08
Payer: MEDICARE

## 2021-03-08 VITALS
HEART RATE: 99 BPM | SYSTOLIC BLOOD PRESSURE: 132 MMHG | OXYGEN SATURATION: 95 % | TEMPERATURE: 97.8 F | DIASTOLIC BLOOD PRESSURE: 83 MMHG

## 2021-03-08 DIAGNOSIS — M54.16 LEFT LUMBAR RADICULOPATHY: ICD-10-CM

## 2021-03-08 DIAGNOSIS — M48.061 SPINAL STENOSIS AT L4-L5 LEVEL: Primary | ICD-10-CM

## 2021-03-08 PROCEDURE — 99213 OFFICE O/P EST LOW 20 MIN: CPT | Performed by: PHYSICAL MEDICINE & REHABILITATION

## 2021-03-08 NOTE — PROGRESS NOTES
[address]  [address], [address],[address]  [place], [address]  Phone: [phone]  Fax: [phone]      Patient: Laura Rosales                                                                            MRN: [id number]        YOB: 1940        AGE: 79 y.o. SEX: male    PCP: Amadou Wolfe MD  Date:  03/08/21    Reason for Consultation: Back Pain and Leg Pain (left)      HPI:  Reid Baird is a 79 y.o. male with relevant PMH of DM2 with neuropathy, PAD on plavix and pletal (not currently taking), HTN,  CKD   who presents with low back pain and right hip pain. 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 Since that time he has had chronic low back pain radiating down bilateral lower extremities L>R. He was seen here by Dr. Cash in 2017 and referred to Dr. Penny Dalton for possible RFA but it was decided due to his multiple co-morbidities and the fact that he was on plavix that the  the risk outweighed the potential benefit. Symptoms acutely worsened after a fall 10/2020- he did not tell anyone initially but was sent to ED recently by his PCP to r/o fracture. He has been taking gabapentin 100mg for his peripheral neuropathy and his PCP recently increased it to 300mg TID. Since his last visit he had an MRI of his lumbar spine which demonstrates multilevel degenerative changes - severe spinal stenosis at L4/5 and b/l foraminal narrowing, left paracentral disc protrusion L5/S1 severe left foraminal narrowing and stress edema right pedicle,  He is also undergoing vascular work up for potential intervention LLE    Neurologic symptoms: No numbness, tingling, weakness, bowel or bladder changes.   No recent falls      Location: The pain is located in the low back radiating down L>R  Radiation: The pain does radiate left leg anterior and posterior to the foot, at times down the right . Pain Score: Currently: 10/10    Quality: Pain is of a Achy, Burning, Stabbing, Tingling, Numbness, Dull, Tight and Pulling quality. Aggravating: Pain is exacerbated by lying down flat on back  Alleviating: The pain is alleviated by lying down on left side    Prior Treatments:   Pain management Dr. Fournier Peer  Dr Junior Bryan- considered RFA but not recommended at that time  NO PT recently- but will start home PT soon  Previous Medications:    Current Medications: Gabapentin increased to 300mg TID  Can't take NSAIDS -CKD  Previous work-up has included:   CT left hip 2/4/2021--no fracture- moderate arthritis    CT lumbar spine 2/4/2021  Disc osteophyte formation and facet arthropathy are noted at multiple levels. Grossly unchanged moderate canal stenosis at the L2-L3 level. Likely severe  canal stenosis is noted at L4-L5. Potential moderate to severe neural foraminal  narrowing. The L5-S1 level demonstrates foraminal extraforaminal disc  protrusions which compresses the neural foramen. X-ray left hip 11/2020-Small nonspecific calcifications and/or ossifications noted adjacent to the left acetabulum and about the greater trochanter.  Advanced atherosclerosis grossly similar.       MRI lumbar spine 2016  L2/3:  Moderately severe disc height loss with diffuse disc bulge with  osteophyte complex. Moderate facet and ligamentous hypertrophy. Severe central  canal stenosis, progressed from prior. Severe right neural foramen stenosis with  right foraminal protrusion impinging on the right L2 foraminal nerve root. Moderate left neural foraminal stenosis similar to prior] hypertrophy contacting  the left foraminal nerve root.     L3/4:  Moderately severe disc height loss with diffuse disc bulge and osteophyte  complex. Moderate facet and ligamentum hypertrophy with prominent epidural fat. Moderate to severe central stenosis, similar to prior.  Moderate right and mild  left foraminal stenosis similar to prior.     L4/5:  Trace grade 1 anterolisthesis and mild disc height loss. Moderate facet  and ligamentous hypertrophy. Decrease in size of the previously seen disc  herniation. Moderate central stenosis improved from prior. Moderate bilateral  foraminal stenosis progressed on the right since comparison.      L5/S1:  Mild central disc protrusion. Moderately severe facet hypertrophy. Patent central canal. Mild foraminal stenosis. Overall similar to prior.     Asymmetric right psoas muscular atrophy at L3 level. No prevertebral soft tissue  edema or phlegmon. No retroperitoneal adenopathy. 2018- lower extremity ABIs-  ABIs are unreliable bilaterally secondary to medial calcinosis  Moderate to severe arterial disease noted bilaterally  Disease is located within the femoral-popliteal and tibial segment bilaterally  Moderate stenosis noted within the right popliteal artery  Moderate to severe stenosis noted within the left superficial femoral artery    Past Medical History:   Past Medical History:   Diagnosis Date    Arthritis     Diabetes ([address]     Hypercholesteremia     Hypertension     Lumbar pain     Prostate disease     Sleep apnea       Past Surgical History:   Past Surgical History:   Procedure Laterality Date    HX HEENT      HX ROTATOR CUFF REPAIR Left       SocHx:   Social History     Tobacco Use    Smoking status: Former Smoker     Quit date: 10/12/1989     Years since quittin.4    Smokeless tobacco: Never Used   Substance Use Topics    Alcohol use: Yes     Comment: occas      FamHx:? Family History   Problem Relation Age of Onset    Cancer Mother     Cancer Sister     Emphysema Brother        Current Medications:    Current Outpatient Medications   Medication Sig Dispense Refill    gabapentin (NEURONTIN) 300 mg capsule Take 1 Cap by mouth three (3) times daily for 30 days.  Max Daily Amount: 900 mg. 90 Cap 0    levothyroxine (synthroid) 25 mcg tablet Take 25 mcg by mouth Daily (before breakfast).  RABEprazole (ACIPHEX) 20 mg tablet Take 20 mg by mouth daily.  cholecalciferol, vitamin D3, (VITAMIN D3) 2,000 unit tab Take  by mouth.  linagliptin (TRADJENTA) 5 mg tablet Take 5 mg by mouth daily.  CONTOUR NEXT STRIPS strip       LEVEMIR FLEXTOUCH 100 unit/mL (3 mL) pen       hydrochlorothiazide (HYDRODIURIL) 25 mg tablet Take 25 mg by mouth daily.  multivitamin, tx-iron-ca-min (THERA-M W/ IRON) 9 mg iron-400 mcg tab tablet Take 1 Tab by mouth daily.  INSULIN LISPRO (HUMALOG KWIKPEN SC) by SubCUTAneous route.  insulin detemir (LEVEMIR) 100 unit/mL injection 0.2 mL by SubCUTAneous route nightly. 1 Vial 1    rosuvastatin (CRESTOR) 10 mg tablet Take 10 mg by mouth nightly.  glimepiride (AMARYL) 4 mg tablet Take 8 mg by mouth every morning.  clopidogrel (PLAVIX) 75 mg tablet Take 75 mg by mouth daily.  tamsulosin (FLOMAX) 0.4 mg capsule Take 0.4 mg by mouth daily.  lidocaine (Lidoderm) 5 % Apply patch to the affected area for 12 hours a day and remove for 12 hours a day. 10 Each 0    cilostazol (PLETAL) 100 mg tablet Take 1 Tab by mouth Before breakfast and dinner. 180 Tab 6    pantoprazole (PROTONIX) 40 mg tablet Take 40 mg by mouth daily.  cilostazol (PLETAL) 100 mg tablet Take 1 Tab by mouth Before breakfast and dinner. 180 Tab 3    vit B Cmplx 3-FA-Vit C-Biotin (NEPHRO ANTOLIN RX) 1- mg-mg-mcg tablet Take 1 Tab by mouth daily.  losartan (COZAAR) 50 mg tablet Take  by mouth daily.  tadalafil (CIALIS) 20 mg tablet Take 20 mg by mouth as needed.  alprostadil (MUSE) 1,000 mcg supp 1 Suppository by Urethral route as needed. Allergies:     Allergies   Allergen Reactions    Amlodipine Swelling    Gabapentin Hives    Lyrica [Pregabalin] Swelling    Orange Juice Rash    Pollen Extracts Other (comments)    Rash Away  [Zinc Oxide-White Petrolatum] Rash        Review of Systems:   Gen:    Denied fevers, chills, malaise, fatigue, weight changes   Resp: Denied shortness of breath, cough, wheezing   CVS: Denied chest pain, palpitations   : Denied urinary urgency, frequency, incontinence   GI: Denied nausea, vomiting, constipation, diarrhea   Skin: Denied rashes, wounds   Psych: Denied anxiety, depression   Vasc: Denied claudication, ulcers   Hem: Denied easy bruising/bleeding   MSK: See HPI   Neuro: See HPI         Physical Exam     Vital Signs:   Visit Vitals  /83 (BP 1 Location: Right upper arm, BP Patient Position: Sitting, BP Cuff Size: Large adult)   Pulse 99   Temp 97.8 °F (36.6 °C) (Temporal)   SpO2 95%      General: ??????? Well nourished and well developed male without any acute distress   Psychiatric: ?  Alert and oriented x 3 with normal mood    HEENT: ???????? Atraumatic   Respiratory:   Breathing non-labored and non dyspneic   CV: ???????????????? Peripheral pulses intact, no peripheral edema   Skin: ????????????? No rashes- left foot erythema, around the great toe, no skin ulceration,+ swelling left foot 1+      Neurologic: ??       Sensation: normal and grossly intact thebilateral, lower extremity(s) slightly diminished sensation to light touch b/l feet  Strength: 5/5 in the bilateral, lower extremity(s) except left foot DF 3/5, EHL 3/5, PF 3/5, knee extension 4/5   Reflexes: absent b/l achilles and patella  Gait: antalgic gait with rollator, unable to walk without assisted device  Upper tract signs: Babinski down going    Musculoskeletal: Lumbar Exam     Inspection:   Alignment: Abnormal    Atrophy: atrophy left calf  Single leg stance: Abnormal unable to perform        Tenderness to Palpation:   Lumbar paraspinals Positive  Lumbar spinous processes Positive  SI Joint:  Positive{  Gluteal:Positive   Greater trochanter: Negative    ROM:   Lumbar ROM: Abnormal- restricted lumbar ROM, pain with flexion and extension, worse with extension  Lumbar facet loading: Positive  Hip ROM: No reproduction of pain with movement     Special Tests      Slump test: Positive    Medical Decision Making:    Images:   MRI lumbar spine 2021  Significant multilevel degenerative disc disease and facet arthropathy. Epidural fat is also present. Severe canal stenosis at L4-L5 with moderate to severe bilateral neural  foraminal narrowing left slightly greater than right. Moderate canal narrowing and moderate bilateral neural foraminal narrowing  at L3-L4 that the potential abutment of the exiting nerve root on the left at  L3-L4. Moderate canal narrowing at L2-L3 with extensive epidural fat with moderate  to severe right and moderate left neural foraminal narrowing. Foraminal extraforaminal disc protrusion especially on the left at L5-S1  causing severe left neural foraminal narrowing and moderate to severe on the  right. Moderate canal narrowing as a result of epidural fat. Modic type I endplate changes at S8-W4 which can be a source for pain.   Stress edema in the right pedicle of L5.    CT lumbar spine- severe spinal stenosis L4/5 severe b/l foraminal narrowing, facet arthrosis, L5/S1 disc bulge with b/l foraminal narrowing  CT hip-mild hip OA  MRI lumbar spine 2016- L2/3 severe spinal stenosis with right foraminal narrowing, L3/4 severe central stenosis, L4/5 moderate central stenosis b/l foraminal narrowing, L5/S1 severe facet arthrosis  Labs:  HbA1C- 2015- 8.6  LFTS unremarkable  Cr- 1.74 2016  ABIs 2018- moderate to severe arterial disease       Assessment:   1. Lumbar spondylosis  2. Spinal stenosis  3. Left lumbar radiculopathy  4. Diabetes  5. Peripheral vascular disease  6. Left foot drop    Plan:      -Physical therapy -  Continue to PT to start lower extremity strengthening, gait training, fall prevention  -Medications - continue gabapentin 300mg TID- was prescribed by PCP but did not go through, renewed rx. Has history in chart of allergy but has been taking gabapentin 300mg without issue.  Counseled regarding side effects and appropriate administration of medications.    -Diagnostics/Imaging - reviewed MRI lumbar spine   -Injections - consider repeat lumbar GABRIELA after imaging-will need to be cautious  With h/o DM and is currently on plavic  -Bracing- referral for AFO, has home PT coming, will see if we can get fitted through PT  - Needs to f/u with vascular - due to covid missed last years appt. Does have left foot pain known PAD  -Follow regularly with podiatrist given DM, peripheral neuropathy and PAD  -Education - The patient's diagnosis, prognosis and treatment options were discussed today. All questions were answered. F/U - after possible vascular intervention.   Will need to see if he can come off plavix for possible GABRIELA            [facility]

## 2021-03-12 ENCOUNTER — DOCUMENTATION ONLY (OUTPATIENT)
Dept: ORTHOPEDIC SURGERY | Age: 81
End: 2021-03-12

## 2021-03-12 NOTE — PROGRESS NOTES
Received medical record request from department of [employer] affairs (T) [phone] faxed to Migdalia Darby to complete. sent to be scanned to chart.

## 2021-04-27 ENCOUNTER — DOCUMENTATION ONLY (OUTPATIENT)
Dept: VASCULAR SURGERY | Age: 81
End: 2021-04-27

## 2021-04-27 NOTE — PROGRESS NOTES
Received request from [facility] for release of medical records. Request was faxed to [facility].

## 2021-05-14 DIAGNOSIS — M48.061 SPINAL STENOSIS AT L4-L5 LEVEL: ICD-10-CM

## 2021-05-14 DIAGNOSIS — M54.16 LEFT LUMBAR RADICULOPATHY: ICD-10-CM

## 2021-05-14 DIAGNOSIS — M21.372 LEFT FOOT DROP: ICD-10-CM

## 2021-05-17 RX ORDER — GABAPENTIN 300 MG/1
300 CAPSULE ORAL 3 TIMES DAILY
Qty: 90 CAP | Refills: 0 | Status: SHIPPED | OUTPATIENT
Start: 2021-05-17 | End: 2021-06-16

## 2021-07-16 ENCOUNTER — TRANSCRIBE ORDER (OUTPATIENT)
Dept: SCHEDULING | Age: 81
End: 2021-07-16

## 2021-07-16 DIAGNOSIS — I73.9 PVD (PERIPHERAL VASCULAR DISEASE) (HCC): Primary | ICD-10-CM

## 2021-07-29 ENCOUNTER — APPOINTMENT (OUTPATIENT)
Dept: VASCULAR SURGERY | Age: 81
End: 2021-07-29
Attending: INTERNAL MEDICINE
Payer: MEDICARE

## 2021-07-29 ENCOUNTER — HOSPITAL ENCOUNTER (OUTPATIENT)
Dept: VASCULAR SURGERY | Age: 81
Discharge: HOME | End: 2021-07-29
Attending: INTERNAL MEDICINE
Payer: MEDICARE

## 2021-07-29 DIAGNOSIS — I73.9 PVD (PERIPHERAL VASCULAR DISEASE) (HCC): ICD-10-CM

## 2021-07-29 PROCEDURE — 93925 LOWER EXTREMITY STUDY: CPT

## 2021-07-30 LAB
LEFT CFA DIST SYS PSV: 97 CM/S
LEFT DIST ATA VELOCITY: 18 CM/S
LEFT DIST PTA PSV: 12.6 CM/S
LEFT POPLITEAL DIST SYS PSV: 29.5 CM/S
LEFT PROX PTA PSV: 98.4 CM/S
LEFT PTA MID SYS PSV: 19.9 CM/S
LEFT SFA DIST VEL RATIO: 0.84
LEFT SFA MID VEL RATIO: 0.16
LEFT SFA PROX VEL RATIO: 1.55
LEFT SUPER FEMORAL DIST SYS PSV: 20.8 CM/S
LEFT SUPER FEMORAL MID SYS PSV: 24.8 CM/S
LEFT SUPER FEMORAL PROX SYS PSV: 150.8 CM/S
RIGHT CFA DIST SYS PSV: 80 CM/S
RIGHT DIST ATA VELOCITY: 32 CM/S
RIGHT DIST PTA PSV: 20.8 CM/S
RIGHT MID ATA VELOCITY: 31.3 CM/S
RIGHT POP A PROX VEL RATIO: 0.2
RIGHT POPLITEAL DIST SYS PSV: 46 CM/S
RIGHT POPLITEAL PROX SYS PSV: 55.2 CM/S
RIGHT PROX ATA VELOCITY: 44.1 CM/S
RIGHT PROX PFA A PSV: 71.1 CM/S
RIGHT PROX PTA PSV: 34 CM/S
RIGHT PTA MID SYS PSV: 40.5 CM/S
RIGHT SFA DIST VEL RATIO: 2.15
RIGHT SFA MID VEL RATIO: 2.1
RIGHT SFA PROX VEL RATIO: 0.8
RIGHT SUPER FEMORAL DIST SYS PSV: 279 CM/S
RIGHT SUPER FEMORAL MID SYS PSV: 129.9 CM/S
RIGHT SUPER FEMORAL PROX SYS PSV: 63.3 CM/S

## 2021-08-03 PROBLEM — M47.816 LUMBAR FACET ARTHROPATHY: Status: RESOLVED | Noted: 2017-02-26 | Resolved: 2021-08-03

## 2022-01-21 PROBLEM — I73.9 GANGRENE DUE TO PERIPHERAL VASCULAR DISEASE (HCC): Status: ACTIVE | Noted: 2022-01-21
